# Patient Record
Sex: FEMALE | Race: WHITE | Employment: FULL TIME | URBAN - METROPOLITAN AREA
[De-identification: names, ages, dates, MRNs, and addresses within clinical notes are randomized per-mention and may not be internally consistent; named-entity substitution may affect disease eponyms.]

---

## 2017-02-07 ENCOUNTER — TELEPHONE (OUTPATIENT)
Dept: FAMILY MEDICINE CLINIC | Age: 42
End: 2017-02-07

## 2017-02-07 ENCOUNTER — OFFICE VISIT (OUTPATIENT)
Dept: FAMILY MEDICINE CLINIC | Age: 42
End: 2017-02-07

## 2017-02-07 ENCOUNTER — HOSPITAL ENCOUNTER (OUTPATIENT)
Dept: VASCULAR LAB | Age: 42
Discharge: HOME | End: 2017-02-07
Attending: FAMILY MEDICINE | Admitting: FAMILY MEDICINE

## 2017-02-07 VITALS
TEMPERATURE: 98.8 F | BODY MASS INDEX: 41.79 KG/M2 | SYSTOLIC BLOOD PRESSURE: 100 MMHG | DIASTOLIC BLOOD PRESSURE: 70 MMHG | WEIGHT: 266.8 LBS | HEART RATE: 68 BPM | RESPIRATION RATE: 12 BRPM

## 2017-02-07 DIAGNOSIS — M77.11 LATERAL EPICONDYLITIS OF RIGHT ELBOW: ICD-10-CM

## 2017-02-07 DIAGNOSIS — D69.6 THROMBOCYTOPENIA (HCC): ICD-10-CM

## 2017-02-07 DIAGNOSIS — M79.604 RIGHT LEG PAIN: ICD-10-CM

## 2017-02-07 DIAGNOSIS — M79.604 PAIN OF RIGHT LEG: ICD-10-CM

## 2017-02-07 DIAGNOSIS — M79.661 PAIN OF RIGHT LOWER LEG: Primary | ICD-10-CM

## 2017-02-07 DIAGNOSIS — F17.210 CIGARETTE NICOTINE DEPENDENCE WITHOUT COMPLICATION: ICD-10-CM

## 2017-02-07 DIAGNOSIS — K85.90 ACUTE PANCREATITIS, UNSPECIFIED COMPLICATION STATUS, UNSPECIFIED PANCREATITIS TYPE: Primary | ICD-10-CM

## 2017-02-07 LAB
D DIMER: 397 NG/ML DDU (ref 0–229)
HCT VFR BLD CALC: 43.6 % (ref 36–48)
HEMOGLOBIN: 14.3 G/DL (ref 12–16)
LIPASE: 33 U/L (ref 13–60)
MCH RBC QN AUTO: 30.2 PG (ref 26–34)
MCHC RBC AUTO-ENTMCNC: 32.8 G/DL (ref 31–36)
MCV RBC AUTO: 92 FL (ref 80–100)
PDW BLD-RTO: 13.2 % (ref 12.4–15.4)
PLATELET # BLD: 167 K/UL (ref 135–450)
PLATELET SLIDE REVIEW: ADEQUATE
PMV BLD AUTO: 9.3 FL (ref 5–10.5)
RBC # BLD: 4.74 M/UL (ref 4–5.2)
WBC # BLD: 7.4 K/UL (ref 4–11)

## 2017-02-07 PROCEDURE — 99214 OFFICE O/P EST MOD 30 MIN: CPT | Performed by: FAMILY MEDICINE

## 2017-02-07 RX ORDER — BUPROPION HYDROCHLORIDE 150 MG/1
150 TABLET ORAL EVERY MORNING
Qty: 30 TABLET | Refills: 3 | Status: CANCELLED | OUTPATIENT
Start: 2017-02-07

## 2019-02-13 ENCOUNTER — OFFICE VISIT (OUTPATIENT)
Dept: FAMILY MEDICINE CLINIC | Age: 44
End: 2019-02-13
Payer: COMMERCIAL

## 2019-02-13 VITALS
HEART RATE: 91 BPM | TEMPERATURE: 97.8 F | BODY MASS INDEX: 41.3 KG/M2 | SYSTOLIC BLOOD PRESSURE: 132 MMHG | HEIGHT: 66 IN | RESPIRATION RATE: 10 BRPM | DIASTOLIC BLOOD PRESSURE: 86 MMHG | WEIGHT: 257 LBS | OXYGEN SATURATION: 100 %

## 2019-02-13 DIAGNOSIS — Z00.00 WELL ADULT EXAM: ICD-10-CM

## 2019-02-13 DIAGNOSIS — Z23 NEED FOR TDAP VACCINATION: ICD-10-CM

## 2019-02-13 DIAGNOSIS — Z01.419 ENCOUNTER FOR ANNUAL ROUTINE GYNECOLOGICAL EXAMINATION: ICD-10-CM

## 2019-02-13 DIAGNOSIS — F17.210 CIGARETTE NICOTINE DEPENDENCE WITHOUT COMPLICATION: ICD-10-CM

## 2019-02-13 DIAGNOSIS — Z00.00 WELL ADULT EXAM: Primary | ICD-10-CM

## 2019-02-13 LAB
A/G RATIO: 1.5 (ref 1.1–2.2)
ALBUMIN SERPL-MCNC: 4.2 G/DL (ref 3.4–5)
ALP BLD-CCNC: 60 U/L (ref 40–129)
ALT SERPL-CCNC: 15 U/L (ref 10–40)
ANION GAP SERPL CALCULATED.3IONS-SCNC: 14 MMOL/L (ref 3–16)
AST SERPL-CCNC: 13 U/L (ref 15–37)
BILIRUB SERPL-MCNC: 0.3 MG/DL (ref 0–1)
BUN BLDV-MCNC: 13 MG/DL (ref 7–20)
CALCIUM SERPL-MCNC: 9.2 MG/DL (ref 8.3–10.6)
CHLORIDE BLD-SCNC: 101 MMOL/L (ref 99–110)
CHOLESTEROL, TOTAL: 210 MG/DL (ref 0–199)
CO2: 25 MMOL/L (ref 21–32)
CREAT SERPL-MCNC: 0.8 MG/DL (ref 0.6–1.1)
GFR AFRICAN AMERICAN: >60
GFR NON-AFRICAN AMERICAN: >60
GLOBULIN: 2.8 G/DL
GLUCOSE BLD-MCNC: 87 MG/DL (ref 70–99)
HCT VFR BLD CALC: 44.3 % (ref 36–48)
HDLC SERPL-MCNC: 46 MG/DL (ref 40–60)
HEMOGLOBIN: 14.8 G/DL (ref 12–16)
LDL CHOLESTEROL CALCULATED: 126 MG/DL
MCH RBC QN AUTO: 31.4 PG (ref 26–34)
MCHC RBC AUTO-ENTMCNC: 33.3 G/DL (ref 31–36)
MCV RBC AUTO: 94.2 FL (ref 80–100)
PDW BLD-RTO: 12.5 % (ref 12.4–15.4)
PLATELET # BLD: 201 K/UL (ref 135–450)
PMV BLD AUTO: 8.5 FL (ref 5–10.5)
POTASSIUM SERPL-SCNC: 4.4 MMOL/L (ref 3.5–5.1)
RBC # BLD: 4.7 M/UL (ref 4–5.2)
SODIUM BLD-SCNC: 140 MMOL/L (ref 136–145)
TOTAL PROTEIN: 7 G/DL (ref 6.4–8.2)
TRIGL SERPL-MCNC: 192 MG/DL (ref 0–150)
TSH REFLEX: 2.85 UIU/ML (ref 0.27–4.2)
VLDLC SERPL CALC-MCNC: 38 MG/DL
WBC # BLD: 7.7 K/UL (ref 4–11)

## 2019-02-13 PROCEDURE — 99396 PREV VISIT EST AGE 40-64: CPT | Performed by: FAMILY MEDICINE

## 2019-02-13 RX ORDER — MEDICAL SUPPLY, MISCELLANEOUS
2 EACH MISCELLANEOUS DAILY
Qty: 2 EACH | Refills: 2 | Status: SHIPPED | OUTPATIENT
Start: 2019-02-13 | End: 2021-03-19 | Stop reason: ALTCHOICE

## 2019-02-13 ASSESSMENT — ENCOUNTER SYMPTOMS
DIARRHEA: 0
TROUBLE SWALLOWING: 0
CHEST TIGHTNESS: 0
ABDOMINAL PAIN: 0
SORE THROAT: 0
SHORTNESS OF BREATH: 0
WHEEZING: 0
VOICE CHANGE: 0
ANAL BLEEDING: 0
CHOKING: 0
NAUSEA: 0
CONSTIPATION: 0
BLOOD IN STOOL: 0

## 2019-02-13 ASSESSMENT — PATIENT HEALTH QUESTIONNAIRE - PHQ9
2. FEELING DOWN, DEPRESSED OR HOPELESS: 0
1. LITTLE INTEREST OR PLEASURE IN DOING THINGS: 0
SUM OF ALL RESPONSES TO PHQ9 QUESTIONS 1 & 2: 0
SUM OF ALL RESPONSES TO PHQ QUESTIONS 1-9: 0
SUM OF ALL RESPONSES TO PHQ QUESTIONS 1-9: 0

## 2019-02-14 LAB
ESTIMATED AVERAGE GLUCOSE: 116.9 MG/DL
HBA1C MFR BLD: 5.7 %

## 2019-02-15 LAB
HPV COMMENT: NORMAL
HPV TYPE 16: NOT DETECTED
HPV TYPE 18: NOT DETECTED
HPVOH (OTHER TYPES): NOT DETECTED

## 2019-03-08 ENCOUNTER — TELEPHONE (OUTPATIENT)
Dept: FAMILY MEDICINE CLINIC | Age: 44
End: 2019-03-08

## 2019-03-17 ENCOUNTER — HOSPITAL ENCOUNTER (EMERGENCY)
Age: 44
Discharge: HOME | End: 2019-03-17
Attending: EMERGENCY MEDICINE
Payer: COMMERCIAL

## 2019-03-17 VITALS
OXYGEN SATURATION: 99 % | DIASTOLIC BLOOD PRESSURE: 86 MMHG | HEIGHT: 68 IN | TEMPERATURE: 98.1 F | SYSTOLIC BLOOD PRESSURE: 137 MMHG | WEIGHT: 252 LBS | HEART RATE: 103 BPM | RESPIRATION RATE: 17 BRPM | BODY MASS INDEX: 38.19 KG/M2

## 2019-03-17 DIAGNOSIS — B02.22 TRIGEMINAL HERPES ZOSTER: Primary | ICD-10-CM

## 2019-03-17 PROCEDURE — 6370000000 HC RX 637 (ALT 250 FOR IP): Performed by: EMERGENCY MEDICINE

## 2019-03-17 PROCEDURE — 99282 EMERGENCY DEPT VISIT SF MDM: CPT

## 2019-03-17 RX ORDER — ACYCLOVIR 800 MG/1
800 TABLET ORAL ONCE
Status: COMPLETED | OUTPATIENT
Start: 2019-03-17 | End: 2019-03-17

## 2019-03-17 RX ORDER — VALACYCLOVIR HYDROCHLORIDE 1 G/1
1000 TABLET, FILM COATED ORAL 3 TIMES DAILY
Qty: 21 TABLET | Refills: 0 | Status: SHIPPED | OUTPATIENT
Start: 2019-03-17 | End: 2019-03-24

## 2019-03-17 RX ADMIN — ACYCLOVIR 800 MG: 800 TABLET ORAL at 05:07

## 2019-03-17 RX ADMIN — FLUORESCEIN SODIUM 1 MG: 1 STRIP OPHTHALMIC at 04:13

## 2019-03-17 ASSESSMENT — PAIN DESCRIPTION - ORIENTATION: ORIENTATION: ANTERIOR

## 2019-03-17 ASSESSMENT — ENCOUNTER SYMPTOMS
RESPIRATORY NEGATIVE: 1
PHOTOPHOBIA: 0
EYE PAIN: 0
EYE DISCHARGE: 0
EYE ITCHING: 0
EYE REDNESS: 0
GASTROINTESTINAL NEGATIVE: 1
ALLERGIC/IMMUNOLOGIC NEGATIVE: 1

## 2019-03-17 ASSESSMENT — PAIN DESCRIPTION - LOCATION
LOCATION: NOSE
LOCATION: FACE;OTHER (COMMENT)

## 2019-03-17 ASSESSMENT — PAIN DESCRIPTION - PAIN TYPE
TYPE: ACUTE PAIN
TYPE: ACUTE PAIN

## 2019-03-17 ASSESSMENT — PAIN SCALES - GENERAL
PAINLEVEL_OUTOF10: 10
PAINLEVEL_OUTOF10: 6

## 2019-03-17 ASSESSMENT — PAIN DESCRIPTION - FREQUENCY: FREQUENCY: CONTINUOUS

## 2019-03-17 ASSESSMENT — PAIN DESCRIPTION - PROGRESSION: CLINICAL_PROGRESSION: GRADUALLY WORSENING

## 2019-03-17 ASSESSMENT — PAIN DESCRIPTION - DESCRIPTORS
DESCRIPTORS: DISCOMFORT;BURNING;OTHER (COMMENT)
DESCRIPTORS: THROBBING

## 2019-03-17 ASSESSMENT — PAIN DESCRIPTION - ONSET: ONSET: PROGRESSIVE

## 2019-03-18 ENCOUNTER — TELEPHONE (OUTPATIENT)
Dept: FAMILY MEDICINE CLINIC | Age: 44
End: 2019-03-18

## 2019-03-18 ENCOUNTER — HOSPITAL ENCOUNTER (EMERGENCY)
Age: 44
Discharge: HOME | End: 2019-03-18
Attending: EMERGENCY MEDICINE
Payer: COMMERCIAL

## 2019-03-18 ENCOUNTER — APPOINTMENT (OUTPATIENT)
Dept: CT IMAGING | Age: 44
End: 2019-03-18
Payer: COMMERCIAL

## 2019-03-18 ENCOUNTER — NURSE TRIAGE (OUTPATIENT)
Dept: OTHER | Facility: CLINIC | Age: 44
End: 2019-03-18

## 2019-03-18 VITALS
HEART RATE: 109 BPM | TEMPERATURE: 98.6 F | RESPIRATION RATE: 20 BRPM | DIASTOLIC BLOOD PRESSURE: 75 MMHG | SYSTOLIC BLOOD PRESSURE: 117 MMHG | OXYGEN SATURATION: 97 %

## 2019-03-18 VITALS
OXYGEN SATURATION: 96 % | TEMPERATURE: 97.8 F | HEIGHT: 67 IN | DIASTOLIC BLOOD PRESSURE: 83 MMHG | BODY MASS INDEX: 39.55 KG/M2 | SYSTOLIC BLOOD PRESSURE: 125 MMHG | HEART RATE: 92 BPM | RESPIRATION RATE: 16 BRPM | WEIGHT: 252 LBS

## 2019-03-18 DIAGNOSIS — L01.00 IMPETIGO: Primary | ICD-10-CM

## 2019-03-18 DIAGNOSIS — L03.211 CELLULITIS OF FACE: Primary | ICD-10-CM

## 2019-03-18 LAB
ANION GAP SERPL CALCULATED.3IONS-SCNC: 13 MMOL/L (ref 3–16)
ANION GAP SERPL CALCULATED.3IONS-SCNC: 15 MMOL/L (ref 3–16)
BASOPHILS ABSOLUTE: 0 K/UL (ref 0–0.2)
BASOPHILS RELATIVE PERCENT: 0.1 %
BILIRUBIN URINE: NEGATIVE
BLOOD, URINE: NEGATIVE
BUN BLDV-MCNC: 11 MG/DL (ref 7–20)
BUN BLDV-MCNC: 11 MG/DL (ref 7–20)
CALCIUM SERPL-MCNC: 8.5 MG/DL (ref 8.3–10.6)
CALCIUM SERPL-MCNC: 8.7 MG/DL (ref 8.3–10.6)
CHLORIDE BLD-SCNC: 100 MMOL/L (ref 99–110)
CHLORIDE BLD-SCNC: 104 MMOL/L (ref 99–110)
CLARITY: CLEAR
CO2: 18 MMOL/L (ref 21–32)
CO2: 24 MMOL/L (ref 21–32)
COLOR: YELLOW
CREAT SERPL-MCNC: 0.9 MG/DL (ref 0.6–1.1)
CREAT SERPL-MCNC: 0.9 MG/DL (ref 0.6–1.1)
EOSINOPHILS ABSOLUTE: 0.2 K/UL (ref 0–0.6)
EOSINOPHILS RELATIVE PERCENT: 2.8 %
GFR AFRICAN AMERICAN: >60
GFR AFRICAN AMERICAN: >60
GFR NON-AFRICAN AMERICAN: >60
GFR NON-AFRICAN AMERICAN: >60
GLUCOSE BLD-MCNC: 111 MG/DL (ref 70–99)
GLUCOSE BLD-MCNC: 150 MG/DL (ref 70–99)
GLUCOSE URINE: NEGATIVE MG/DL
HCT VFR BLD CALC: 44.4 % (ref 36–48)
HEMOGLOBIN: 14.9 G/DL (ref 12–16)
KETONES, URINE: NEGATIVE MG/DL
LACTATE: 3.28 MMOL/L (ref 0.4–2)
LACTIC ACID: 1.6 MMOL/L (ref 0.4–2)
LEUKOCYTE ESTERASE, URINE: NEGATIVE
LYMPHOCYTES ABSOLUTE: 0.6 K/UL (ref 1–5.1)
LYMPHOCYTES RELATIVE PERCENT: 8.3 %
MCH RBC QN AUTO: 31.3 PG (ref 26–34)
MCHC RBC AUTO-ENTMCNC: 33.5 G/DL (ref 31–36)
MCV RBC AUTO: 93.3 FL (ref 80–100)
MICROSCOPIC EXAMINATION: NORMAL
MONOCYTES ABSOLUTE: 0.3 K/UL (ref 0–1.3)
MONOCYTES RELATIVE PERCENT: 4.7 %
NEUTROPHILS ABSOLUTE: 5.7 K/UL (ref 1.7–7.7)
NEUTROPHILS RELATIVE PERCENT: 84.1 %
NITRITE, URINE: NEGATIVE
PDW BLD-RTO: 12.5 % (ref 12.4–15.4)
PERFORMED ON: ABNORMAL
PH UA: 6 (ref 5–8)
PLATELET # BLD: 139 K/UL (ref 135–450)
PMV BLD AUTO: 8 FL (ref 5–10.5)
POC SAMPLE TYPE: ABNORMAL
POTASSIUM REFLEX MAGNESIUM: 4 MMOL/L (ref 3.5–5.1)
POTASSIUM REFLEX MAGNESIUM: 6.6 MMOL/L (ref 3.5–5.1)
POTASSIUM SERPL-SCNC: 4 MMOL/L (ref 3.5–5.1)
PROTEIN UA: NEGATIVE MG/DL
RBC # BLD: 4.76 M/UL (ref 4–5.2)
SODIUM BLD-SCNC: 133 MMOL/L (ref 136–145)
SODIUM BLD-SCNC: 141 MMOL/L (ref 136–145)
SPECIFIC GRAVITY UA: 1.01 (ref 1–1.03)
URINE REFLEX TO CULTURE: NORMAL
URINE TYPE: NORMAL
UROBILINOGEN, URINE: 0.2 E.U./DL
WBC # BLD: 6.7 K/UL (ref 4–11)

## 2019-03-18 PROCEDURE — 99282 EMERGENCY DEPT VISIT SF MDM: CPT

## 2019-03-18 PROCEDURE — 87040 BLOOD CULTURE FOR BACTERIA: CPT

## 2019-03-18 PROCEDURE — 99283 EMERGENCY DEPT VISIT LOW MDM: CPT

## 2019-03-18 PROCEDURE — 85025 COMPLETE CBC W/AUTO DIFF WBC: CPT

## 2019-03-18 PROCEDURE — 84132 ASSAY OF SERUM POTASSIUM: CPT

## 2019-03-18 PROCEDURE — 6360000004 HC RX CONTRAST MEDICATION: Performed by: STUDENT IN AN ORGANIZED HEALTH CARE EDUCATION/TRAINING PROGRAM

## 2019-03-18 PROCEDURE — 80048 BASIC METABOLIC PNL TOTAL CA: CPT

## 2019-03-18 PROCEDURE — 96360 HYDRATION IV INFUSION INIT: CPT

## 2019-03-18 PROCEDURE — 83605 ASSAY OF LACTIC ACID: CPT

## 2019-03-18 PROCEDURE — 2580000003 HC RX 258: Performed by: STUDENT IN AN ORGANIZED HEALTH CARE EDUCATION/TRAINING PROGRAM

## 2019-03-18 PROCEDURE — 70487 CT MAXILLOFACIAL W/DYE: CPT

## 2019-03-18 PROCEDURE — 36415 COLL VENOUS BLD VENIPUNCTURE: CPT

## 2019-03-18 PROCEDURE — 81003 URINALYSIS AUTO W/O SCOPE: CPT

## 2019-03-18 RX ORDER — 0.9 % SODIUM CHLORIDE 0.9 %
1000 INTRAVENOUS SOLUTION INTRAVENOUS ONCE
Status: COMPLETED | OUTPATIENT
Start: 2019-03-18 | End: 2019-03-18

## 2019-03-18 RX ORDER — SULFAMETHOXAZOLE AND TRIMETHOPRIM 800; 160 MG/1; MG/1
1 TABLET ORAL 2 TIMES DAILY
Qty: 14 TABLET | Refills: 0 | Status: SHIPPED | OUTPATIENT
Start: 2019-03-18 | End: 2019-03-25

## 2019-03-18 RX ORDER — CEPHALEXIN 500 MG/1
500 CAPSULE ORAL 3 TIMES DAILY
Qty: 21 CAPSULE | Refills: 0 | Status: SHIPPED | OUTPATIENT
Start: 2019-03-18 | End: 2019-03-25

## 2019-03-18 RX ADMIN — IOPAMIDOL 80 ML: 755 INJECTION, SOLUTION INTRAVENOUS at 22:14

## 2019-03-18 RX ADMIN — SODIUM CHLORIDE 1000 ML: 0.9 INJECTION, SOLUTION INTRAVENOUS at 20:50

## 2019-03-18 ASSESSMENT — ENCOUNTER SYMPTOMS
NAUSEA: 0
VOMITING: 0
SHORTNESS OF BREATH: 0
COUGH: 0
DIARRHEA: 0
ABDOMINAL PAIN: 0

## 2019-03-18 ASSESSMENT — PAIN DESCRIPTION - LOCATION: LOCATION: GENERALIZED

## 2019-03-18 ASSESSMENT — PAIN DESCRIPTION - DESCRIPTORS: DESCRIPTORS: ACHING

## 2019-03-18 ASSESSMENT — PAIN SCALES - GENERAL: PAINLEVEL_OUTOF10: 8

## 2019-03-18 ASSESSMENT — PAIN DESCRIPTION - FREQUENCY: FREQUENCY: CONTINUOUS

## 2019-03-21 ENCOUNTER — OFFICE VISIT (OUTPATIENT)
Dept: FAMILY MEDICINE CLINIC | Age: 44
End: 2019-03-21
Payer: COMMERCIAL

## 2019-03-21 VITALS
BODY MASS INDEX: 40.63 KG/M2 | OXYGEN SATURATION: 95 % | SYSTOLIC BLOOD PRESSURE: 137 MMHG | WEIGHT: 259.4 LBS | DIASTOLIC BLOOD PRESSURE: 87 MMHG | TEMPERATURE: 98.7 F | HEART RATE: 101 BPM

## 2019-03-21 DIAGNOSIS — R68.89 FLU-LIKE SYMPTOMS: ICD-10-CM

## 2019-03-21 DIAGNOSIS — L03.211 CELLULITIS OF FACE: Primary | ICD-10-CM

## 2019-03-21 PROCEDURE — 1111F DSCHRG MED/CURRENT MED MERGE: CPT | Performed by: FAMILY MEDICINE

## 2019-03-21 PROCEDURE — 99213 OFFICE O/P EST LOW 20 MIN: CPT | Performed by: FAMILY MEDICINE

## 2019-03-21 RX ORDER — DOXYCYCLINE HYCLATE 100 MG
100 TABLET ORAL 2 TIMES DAILY
Qty: 20 TABLET | Refills: 0 | Status: SHIPPED | OUTPATIENT
Start: 2019-03-21 | End: 2019-03-31

## 2019-03-21 RX ORDER — SULFAMETHOXAZOLE AND TRIMETHOPRIM 800; 160 MG/1; MG/1
1 TABLET ORAL 2 TIMES DAILY
Qty: 14 TABLET | Refills: 0 | Status: SHIPPED | OUTPATIENT
Start: 2019-03-21 | End: 2019-03-28

## 2019-03-23 LAB
BLOOD CULTURE, ROUTINE: NORMAL
CULTURE, BLOOD 2: NORMAL

## 2019-03-29 ENCOUNTER — OFFICE VISIT (OUTPATIENT)
Dept: FAMILY MEDICINE CLINIC | Age: 44
End: 2019-03-29
Payer: COMMERCIAL

## 2019-03-29 VITALS
TEMPERATURE: 98.1 F | HEART RATE: 80 BPM | DIASTOLIC BLOOD PRESSURE: 70 MMHG | OXYGEN SATURATION: 97 % | BODY MASS INDEX: 40.63 KG/M2 | WEIGHT: 259.4 LBS | SYSTOLIC BLOOD PRESSURE: 110 MMHG

## 2019-03-29 DIAGNOSIS — L03.211 FACIAL CELLULITIS: Primary | ICD-10-CM

## 2019-03-29 DIAGNOSIS — T50.905A ADVERSE EFFECT OF DRUG, INITIAL ENCOUNTER: ICD-10-CM

## 2019-03-29 PROCEDURE — 99213 OFFICE O/P EST LOW 20 MIN: CPT | Performed by: FAMILY MEDICINE

## 2019-05-13 ENCOUNTER — TELEPHONE (OUTPATIENT)
Dept: FAMILY MEDICINE CLINIC | Age: 44
End: 2019-05-13

## 2019-05-13 RX ORDER — VALACYCLOVIR HYDROCHLORIDE 1 G/1
1000 TABLET, FILM COATED ORAL 3 TIMES DAILY
Qty: 21 TABLET | Refills: 0 | Status: SHIPPED | OUTPATIENT
Start: 2019-05-13 | End: 2019-05-20

## 2019-05-14 ENCOUNTER — OFFICE VISIT (OUTPATIENT)
Dept: FAMILY MEDICINE CLINIC | Age: 44
End: 2019-05-14
Payer: COMMERCIAL

## 2019-05-14 VITALS
HEIGHT: 68 IN | RESPIRATION RATE: 8 BRPM | BODY MASS INDEX: 39.56 KG/M2 | DIASTOLIC BLOOD PRESSURE: 73 MMHG | OXYGEN SATURATION: 98 % | HEART RATE: 91 BPM | TEMPERATURE: 96.2 F | WEIGHT: 261 LBS | SYSTOLIC BLOOD PRESSURE: 115 MMHG

## 2019-05-14 DIAGNOSIS — J34.0 NASAL ABSCESS: Primary | ICD-10-CM

## 2019-05-14 PROCEDURE — 99213 OFFICE O/P EST LOW 20 MIN: CPT | Performed by: FAMILY MEDICINE

## 2019-05-14 RX ORDER — DOXYCYCLINE HYCLATE 100 MG/1
100 CAPSULE ORAL 2 TIMES DAILY
Qty: 20 CAPSULE | Refills: 0 | Status: SHIPPED | OUTPATIENT
Start: 2019-05-14 | End: 2019-05-24

## 2019-05-14 NOTE — LETTER
[address] [place] [address]  Phone: [phone]  Fax: [phone]    Nick Stokes MD                                                                                   May 14, 2019                       [address] [place] [address]      Dear Jose Jha: Thank you for enrolling in [address] Please follow the instructions below to securely access your online medical record. [employer] allows you to send messages to your doctor, view your test results, renew your prescriptions, schedule appointments, and more. How Do I Sign Up? 1. In your Internet browser, go to [email or web address]  2. Click on the Sign Up Now link in the Sign In box. You will see the New Member Sign Up page. 3. Enter your [employer] Access Code exactly as it appears below. You will not need to use this code after youve completed the sign-up process. If you do not sign up before the expiration date, you must request a new code. [employer] Access Code: [email or web address]  Expires: 6/28/2019  9:11 AM    4. Enter your Social Security Number ([id number]) and Date of Birth (mm/dd/yyyy) as indicated and click Submit. You will be taken to the next sign-up page. 5. Create a [employer] ID. This will be your [employer] login ID and cannot be changed, so think of one that is secure and easy to remember. 6. Create a [employer] password. You can change your password at any time. 7. Enter your Password Reset Question and Answer. This can be used at a later time if you forget your password. 8. Enter your e-mail address. You will receive e-mail notification when new information is available in [facility] [address] [facility] [address]. 9. Click Sign Up. You can now view your medical record. Additional Information  If you have questions, please contact the physician practice where you receive care. Remember, [employer] is NOT to be used for urgent needs. For medical emergencies, dial 911.

## 2019-05-14 NOTE — PROGRESS NOTES
Subjective:      Patient ID: Kathy Aparicio 38 y.o. female. The encounter diagnosis was Herpes zoster without complication. HPI    Teagan called yesterday with rash on her nose concerning for shingles. Started on Valtrex empirically. She has recurrent redness nose x 2. First episode 3/18. Was treated in the ER with Bactrim and Keflex for Impetigo. Valtrex was added for possible shingles. Had myalgias with Bactrim. Was switched to Doxycycline and sxs resolved. Occurred again 3 days ago. Gradually getting worse - increase in redness of the now and now has a pustule on the nose. No fever, chills or malaise. Rx: Valtrex. Outpatient Medications Marked as Taking for the 5/14/19 encounter (Office Visit) with Vicente Ashley MD   Medication Sig Dispense Refill    valACYclovir (VALTREX) 1 g tablet Take 1 tablet by mouth 3 times daily for 7 days 21 tablet 0    Elastic Bandages & Supports (FUTURO SHEER SUPPORT HOSE) MISC 2 Units by Does not apply route daily 2 each 2        Allergies   Allergen Reactions    Bactrim [Sulfamethoxazole-Trimethoprim] Other (See Comments)     Flu like symptoms       Patient Active Problem List   Diagnosis    Hirsutism    Edema    Cigarette nicotine dependence without complication       Past Medical History:   Diagnosis Date    Acute bronchitis     BV (bacterial vaginosis) 10/20/2016    Edema     Hirsutism     Shingles 7/29/2011       No past surgical history on file.      Family History   Problem Relation Age of Onset    Osteoarthritis Mother     Hypertension Mother     Diabetes Mother     Ulcerative Colitis Mother     Colon Polyps Mother     Elevated Lipids Mother     Hypertension Father    [facility] Elevated Lipids Father     Heart Attack Father 61    Deep Vein Thrombosis Brother         wtih PE; no trigger       Social History     Tobacco Use    Smoking status: Current Every Day Smoker     Packs/day: 0.50     Years: 22.00     Pack years: 11.00    Smokeless tobacco: Never Used   Substance Use Topics    Alcohol use: Yes    Drug use: No            Review of Systems  Review of Systems    Objective:   Physical Exam  There were no vitals filed for this visit. Physical Exam   Constitutional: She appears well-developed and well-nourished. No distress. Cardiovascular: Normal rate, regular rhythm and normal heart sounds. Pulmonary/Chest: Effort normal and breath sounds normal.   Lymphadenopathy:        Head (right side): No submental and no submandibular adenopathy present. Head (left side): No submental and no submandibular adenopathy present. She has no cervical adenopathy. Skin: Skin is warm and dry. Rash noted. Rash is pustular. There is erythema. 3 mm pustule with 1.5 cm induration and erythema left side nose. Assessment:      .   Diagnosis Orders   1. Nasal abscess  mupirocin (BACTROBAN NASAL) 2 % nasal ointment    doxycycline hyclate (VIBRAMYCIN) 100 MG capsule    ANAEROBIC AND AEROBIC CULTURE          Plan:      I do not think this is shingles; can stop Valtrex. Good hand washing advised. Side effects of current medications reviewed and questions answered. Call or return to clinic prn if these symptoms worsen or fail to improve as anticipated.

## 2019-05-16 LAB
ANAEROBIC CULTURE: ABNORMAL
GRAM STAIN RESULT: ABNORMAL
WOUND/ABSCESS: ABNORMAL

## 2020-03-13 ENCOUNTER — NURSE TRIAGE (OUTPATIENT)
Dept: OTHER | Facility: CLINIC | Age: 45
End: 2020-03-13

## 2020-03-13 NOTE — TELEPHONE ENCOUNTER
Reason for Disposition   Nasal discharge present > 10 days    Protocols used: COUGH-ADULT-OH    Patient called pre-service center [facility] to schedule appointment, with red flag complaint, transferred to RN access for triage. Pt c/o feeling ill over the last 1-2 weeks. Pt states symptoms started with sinus and sneezing. Now it's more in her chest and she has productive cough. No fever. Pt is smoker. No ear pain. No sore throat. No sob. No cp. Pt has been using mucinex without relief. Fluid intake is good. Triage indicates for pt to be seen in the next 3 days. Pt instructed to call back for any new or worsening symptoms. Patient verbalized understanding. Patient denies any other questions or concerns. Writer provided warm transfer to [facility] Bety Herrera for appointment scheduling. Please do not respond to the triage nurse through this encounter. Any subsequent communication should be directly with the patient.

## 2020-03-16 ENCOUNTER — OFFICE VISIT (OUTPATIENT)
Dept: FAMILY MEDICINE CLINIC | Age: 45
End: 2020-03-16
Payer: COMMERCIAL

## 2020-03-16 VITALS
WEIGHT: 274.5 LBS | OXYGEN SATURATION: 98 % | SYSTOLIC BLOOD PRESSURE: 120 MMHG | BODY MASS INDEX: 42.36 KG/M2 | DIASTOLIC BLOOD PRESSURE: 82 MMHG | TEMPERATURE: 97.2 F | RESPIRATION RATE: 12 BRPM | HEART RATE: 89 BPM

## 2020-03-16 PROCEDURE — 99214 OFFICE O/P EST MOD 30 MIN: CPT | Performed by: FAMILY MEDICINE

## 2020-03-16 RX ORDER — VARENICLINE TARTRATE
KIT
Qty: 1 BOX | Refills: 0 | Status: SHIPPED | OUTPATIENT
Start: 2020-03-16 | End: 2021-03-19 | Stop reason: ALTCHOICE

## 2020-03-16 RX ORDER — AMOXICILLIN AND CLAVULANATE POTASSIUM 875; 125 MG/1; MG/1
1 TABLET, FILM COATED ORAL 2 TIMES DAILY
Qty: 20 TABLET | Refills: 0 | Status: SHIPPED | OUTPATIENT
Start: 2020-03-16 | End: 2020-03-26

## 2020-03-16 RX ORDER — GUAIFENESIN 600 MG/1
1200 TABLET, EXTENDED RELEASE ORAL 2 TIMES DAILY
COMMUNITY
End: 2021-03-19 | Stop reason: ALTCHOICE

## 2020-03-16 RX ORDER — VARENICLINE TARTRATE 1 MG/1
1 TABLET, FILM COATED ORAL 2 TIMES DAILY
Qty: 60 TABLET | Refills: 5 | Status: SHIPPED | OUTPATIENT
Start: 2020-03-16 | End: 2021-03-19 | Stop reason: ALTCHOICE

## 2020-03-16 RX ORDER — IBUPROFEN 200 MG
400 TABLET ORAL EVERY 6 HOURS PRN
COMMUNITY

## 2020-03-16 ASSESSMENT — PATIENT HEALTH QUESTIONNAIRE - PHQ9
SUM OF ALL RESPONSES TO PHQ QUESTIONS 1-9: 0
2. FEELING DOWN, DEPRESSED OR HOPELESS: 0
SUM OF ALL RESPONSES TO PHQ9 QUESTIONS 1 & 2: 0
1. LITTLE INTEREST OR PLEASURE IN DOING THINGS: 0
SUM OF ALL RESPONSES TO PHQ QUESTIONS 1-9: 0

## 2020-03-16 NOTE — LETTER
For questions regarding your [employer] account call [phone]. If you have a clinical question, please call your doctor's office.     Sincerely,  Agnieszka Tuttle MD

## 2020-03-16 NOTE — PATIENT INSTRUCTIONS
with others, smoke alone. Turn your chair to an empty corner and focus only on the cigarette you are smoking and all its many negative effects. Collect all your cigarette butts in one large glass container as a visual reminder of the filth made by smoking. Just Before Quitting   Practice going without cigarettes. Don't think of never smoking again. Think of quitting in terms of one day at a time . Tell yourself you won't smoke today, and then don't. Clean your clothes to rid them of the cigarette smell, which can linger a long time. On the Day You Quit   Throw away all your cigarettes and matches. Hide your lighters and ashtrays. Visit the dentist and have your teeth cleaned to get rid of tobacco stains. Notice how nice they look and resolve to keep them that way. Make a list of things you'd like to buy for yourself or someone else. Estimate the cost in terms of packs of cigarettes, and put the money aside to buy these presents. Keep very busy on the big day. Go to the movies, exercise, take long walks, or go bike riding. Remind your family and friends that this is your quit date, and ask them to help you over the rough spots of the first couple of days and weeks. Buy yourself a treat or do something special to celebrate. Telephone and Internet Support   Telephone, web-, and computer-based programs can offer you the support that you need to quit and to stay smoke-free. You can find many programs online, like the American Lung Association's [place] from Smoking . Immediately After Quitting   Develop a clean, fresh, nonsmoking environment around yourselfat work and at home. Buy yourself flowersyou may be surprised how much you can enjoy their scent now. The first few days after you quit, spend as much free time as possible in places where smoking isn't allowed, such as [address] [place] [address] Street, museums, theaters, department stores, and churches.    Drink large quantities of water and fruit juice (but avoid sodas that contain caffeine). Try to avoid alcohol, coffee, and other beverages that you associate with cigarette smoking. Strike up conversation instead of a match for a cigarette. If you miss the sensation of having a cigarette in your hand, play with something elsea pencil, a paper clip, a marble. If you miss having something in your mouth, try toothpicks or a fake cigarette. Here are some useful phone numbers and resources for you to help your smoking cessation. [address] [place] [address] Street: [phone]  Smoking cessation programs in [facility]: [phone]  \"Freshstart\" a 4-session program for smoking cessation - group sessions    [facility] Use Prevention and Control [facility]: 1800-QUIT-NOW     [facility]: 338-991-NQFH  \"Freshstart' - 4-session program for smoking cessation - group sessions    [facility]: [phone]  Personal Smoking cessation consultations - teens and adults  [place] By appointment $360    [facility]: [phone]  Offers individual hypnosis, behavior modifications, and counseling in this program. Three sessions over 2-week period  $155 (total cost)    Nicotine Anonymous: [phone]  Open group cessation - everyone welcome     American Cancer Society: 575.454.2400    American Lung Association: 1-800-LUNG-USA    On-line help:  www.cancer. org  www.quitnet. org  www. whyquit. com  www.stopsmokingsuppport. com  www. ffsonline. org

## 2020-03-16 NOTE — PROGRESS NOTES
Subjective:      Patient ID: Tosin Farmer 41 y.o. female. is here for evaluation for URI. HPI    Iker Lloyd complains of cough and congestion x 3 weeks. No fever and no travel. The cough is productive green to yellow mucous and nasal discharge is the same. No ST.  Ears are a bit itchy. Denies, cheat pain, dyspnea, facial pain, fatigue or malaise. occ loose stool. No nausea or vomiting. Getting worse the past week  She also has pain in a right lower tooth x 7 days. Sensitive to heat and cold. She has not called her dentist yet. Rx: Ibuprofen helps a bit. Is worried about weight. Outpatient Medications Marked as Taking for the 3/16/20 encounter (Office Visit) with Shayla Workman MD   Medication Sig Dispense Refill    ibuprofen (ADVIL;MOTRIN) 200 MG tablet Take 200 mg by mouth every 6 hours as needed for Pain      guaiFENesin (MUCINEX) 600 MG extended release tablet Take 1,200 mg by mouth 2 times daily          Allergies   Allergen Reactions    Bactrim [Sulfamethoxazole-Trimethoprim] Other (See Comments)     Flu like symptoms       Patient Active Problem List   Diagnosis    Hirsutism    Edema    Cigarette nicotine dependence without complication       Past Medical History:   Diagnosis Date    Acute bronchitis     BV (bacterial vaginosis) 10/20/2016    Edema     Hirsutism     Shingles 7/29/2011       History reviewed. No pertinent surgical history.      Family History   Problem Relation Age of Onset    Osteoarthritis Mother     Hypertension Mother     Diabetes Mother     Ulcerative Colitis Mother     Colon Polyps Mother     Elevated Lipids Mother     Hypertension Father    [facility] Elevated Lipids Father     Heart Attack Father 61    Deep Vein Thrombosis Brother         wtih PE; no trigger       Social History     Tobacco Use    Smoking status: Current Every Day Smoker     Packs/day: 0.50     Years: 22.00     Pack years: 11.00    Smokeless tobacco: Never Used   Substance Use Topics    Alcohol use: Yes    Drug use: No            Review of Systems  Review of Systems    Objective:   Physical Exam  Vitals:    03/16/20 1612 03/16/20 1620   BP: (!) 141/89 120/82   Pulse: 89    Resp: 12    Temp: 97.2 °F (36.2 °C)    TempSrc: Oral    SpO2: 98%    Weight: 274 lb 8 oz (124.5 kg)         Physical Exam  NAD    Skin is warm and dry. Well hydrated, no rash. No respiratory distress. Ear exam - bilateral normal, TM intact without perforation or effusion, external canal normal. No significant ceruminosis noted. . Nares boggy with mucoid discharge. Moderate maxillary sinus tenderness. Pharynx normal, no oral lesions. Shotty cervical LNS, neg submandibular and supraclavicular LNS. Chest is clear, no wheezing or rales. Normal symmetric air entry throughout both lung fields. Cardiac regular w/o murmer, gallop. Assessment:       Diagnosis Orders   1. Acute non-recurrent maxillary sinusitis  amoxicillin-clavulanate (AUGMENTIN) 875-125 MG per tablet   2. Cigarette nicotine dependence without complication  varenicline (CHANTIX) 1 MG tablet    varenicline (CHANTIX STARTING MONTH PRACHI) 0.5 MG X 11 & 1 MG X 42 tablet  Smoking cessation addressed   3. Class 3 severe obesity due to excess calories without serious comorbidity with body mass index (BMI) of 40.0 to 44.9 in adult [facility])  Discussed diet, exercise and weight loss strategies           Plan:      Side effects of current medications reviewed and questions answered. Call or return to clinic prn if these symptoms worsen or fail to improve as anticipated.

## 2020-11-25 ENCOUNTER — TELEMEDICINE (OUTPATIENT)
Dept: FAMILY MEDICINE CLINIC | Age: 45
End: 2020-11-25

## 2020-11-25 ENCOUNTER — TELEPHONE (OUTPATIENT)
Dept: FAMILY MEDICINE CLINIC | Age: 45
End: 2020-11-25

## 2020-11-25 NOTE — TELEPHONE ENCOUNTER
Patient calling stating she tested postive for covid she took a rapid test at her place of work, she is requesting to make an appointment with the provider.      Virtual Visit appointment made  9:30am  12/3/20

## 2020-11-27 ENCOUNTER — TELEPHONE (OUTPATIENT)
Dept: FAMILY MEDICINE CLINIC | Age: 45
End: 2020-11-27

## 2021-03-18 ENCOUNTER — TELEPHONE (OUTPATIENT)
Dept: FAMILY MEDICINE CLINIC | Age: 46
End: 2021-03-18

## 2021-03-18 NOTE — TELEPHONE ENCOUNTER
See telephone note  ----- Message from Shabbir Nam sent at 3/18/2021 11:32 AM EDT -----  Subject: Message to Provider    QUESTIONS  Information for Provider? per patient stated dr Stefania Orozco agreed to see her   2 sons both need an appt.   ---------------------------------------------------------------------------  --------------  CALL BACK INFO  What is the best way for the office to contact you? OK to leave message on   voicemail  Preferred Call Back Phone Number? [phone]  ---------------------------------------------------------------------------  --------------  SCRIPT ANSWERS  Relationship to Patient?  Self

## 2021-03-18 NOTE — TELEPHONE ENCOUNTER
PT called in to see if Dr. Van Jernigan be willing to see her sons as new patients. If son can be np will change her appt around to get him in at the same time.      Please advise, thanks

## 2021-03-19 ENCOUNTER — HOSPITAL ENCOUNTER (EMERGENCY)
Age: 46
Discharge: HOME | End: 2021-03-19
Payer: COMMERCIAL

## 2021-03-19 ENCOUNTER — APPOINTMENT (OUTPATIENT)
Dept: GENERAL RADIOLOGY | Age: 46
End: 2021-03-19
Payer: COMMERCIAL

## 2021-03-19 VITALS
HEIGHT: 68 IN | HEART RATE: 88 BPM | RESPIRATION RATE: 20 BRPM | BODY MASS INDEX: 40.92 KG/M2 | SYSTOLIC BLOOD PRESSURE: 144 MMHG | DIASTOLIC BLOOD PRESSURE: 77 MMHG | OXYGEN SATURATION: 96 % | WEIGHT: 270 LBS | TEMPERATURE: 97.9 F

## 2021-03-19 DIAGNOSIS — M25.511 ACUTE PAIN OF RIGHT SHOULDER: Primary | ICD-10-CM

## 2021-03-19 PROCEDURE — 6370000000 HC RX 637 (ALT 250 FOR IP): Performed by: NURSE PRACTITIONER

## 2021-03-19 PROCEDURE — 73030 X-RAY EXAM OF SHOULDER: CPT

## 2021-03-19 PROCEDURE — 99282 EMERGENCY DEPT VISIT SF MDM: CPT

## 2021-03-19 RX ORDER — ACETAMINOPHEN 325 MG/1
650 TABLET ORAL ONCE
Status: COMPLETED | OUTPATIENT
Start: 2021-03-19 | End: 2021-03-19

## 2021-03-19 RX ORDER — LIDOCAINE 4 G/G
1 PATCH TOPICAL ONCE
Status: DISCONTINUED | OUTPATIENT
Start: 2021-03-19 | End: 2021-03-20 | Stop reason: HOSPADM

## 2021-03-19 RX ORDER — LIDOCAINE 50 MG/G
1 PATCH TOPICAL DAILY
Qty: 30 PATCH | Refills: 0 | Status: SHIPPED | OUTPATIENT
Start: 2021-03-19 | End: 2021-03-19 | Stop reason: SDUPTHER

## 2021-03-19 RX ORDER — LIDOCAINE 4 G/G
1 PATCH TOPICAL DAILY
Status: DISCONTINUED | OUTPATIENT
Start: 2021-03-20 | End: 2021-03-19

## 2021-03-19 RX ORDER — LIDOCAINE 50 MG/G
1 PATCH TOPICAL DAILY
Qty: 30 PATCH | Refills: 0 | Status: SHIPPED | OUTPATIENT
Start: 2021-03-19 | End: 2021-05-06 | Stop reason: ALTCHOICE

## 2021-03-19 RX ADMIN — ACETAMINOPHEN 650 MG: 325 TABLET ORAL at 22:27

## 2021-03-19 ASSESSMENT — PAIN SCALES - GENERAL: PAINLEVEL_OUTOF10: 6

## 2021-03-19 ASSESSMENT — PAIN DESCRIPTION - FREQUENCY: FREQUENCY: INTERMITTENT

## 2021-03-19 ASSESSMENT — PAIN DESCRIPTION - PAIN TYPE: TYPE: ACUTE PAIN

## 2021-03-19 ASSESSMENT — PAIN DESCRIPTION - LOCATION: LOCATION: ARM

## 2021-03-19 ASSESSMENT — PAIN DESCRIPTION - ORIENTATION: ORIENTATION: LEFT

## 2021-03-20 NOTE — ED PROVIDER NOTES
[facility]  EMERGENCY DEPARTMENT ENCOUNTER          NURSE PRACTITIONER NOTE       Date of evaluation: 3/19/2021    Chief Complaint     Arm Pain (right arm pain x one month, has been working out ? injured, pain comes and goes, pain increased today with certain movements)    History of Present Illness     Dulce Elizabeth is a 40 y.o. female who presents to the emergency department with a complaint of ongoing right arm pain. Patient states he has had pain for approximately the last month, seems to be progressively worsening. 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 She is right-hand dominant. States that today the pain seemed to get so bad that she felt that she needed evaluation. She did call her primary care provider and has an appointment for next week, however did not feel she could wait that long. Currently rates her pain at a 6 out of 10, aching and sharp in nature. Patient states that the pain is in her shoulder, however seems to radiate throughout her entire arm. Denies specific injury or trauma. Review of Systems     Review of Systems   Constitutional: Negative. Musculoskeletal: Positive for arthralgias. Skin: Negative. Allergic/Immunologic: Negative for immunocompromised state. Neurological: Negative for numbness. Hematological: Does not bruise/bleed easily. Psychiatric/Behavioral: Negative. Past Medical, Surgical, Family, and Social History     She has a past medical history of Acute bronchitis, BV (bacterial vaginosis), COVID-19, Edema, Hirsutism, Obesity, and Shingles. She has no past surgical history on file.   Her family history includes Colon Polyps in her mother; Deep Vein Thrombosis in her brother; Diabetes in her mother; Elevated Lipids in her father and mother; Heart Attack (age of onset: 61) in her father; Hypertension in her father and mother; Osteoarthritis in her mother; Ulcerative Colitis in her mother. She reports that she has been smoking cigarettes. She has a 16.50 pack-year smoking history. She has never used smokeless tobacco. She reports current alcohol use. She reports that she does not use drugs. Medications     Current Discharge Medication List      CONTINUE these medications which have NOT CHANGED    Details   ibuprofen (ADVIL;MOTRIN) 200 MG tablet Take 400 mg by mouth every 6 hours as needed for Pain Took 800 mg at 1730 today 03/19/2021             Allergies     She is allergic to bactrim [sulfamethoxazole-trimethoprim]. Physical Exam     INITIAL VITALS: BP: (!) 144/77, Temp: 97.9 °F (36.6 °C), Pulse: 88, Resp: 20, SpO2: 96 %   Physical Exam  Vitals signs and nursing note reviewed. Constitutional:       Appearance: Normal appearance. She is obese. Cardiovascular:      Rate and Rhythm: Normal rate and regular rhythm. Pulses: Normal pulses. Heart sounds: Normal heart sounds. No murmur. No gallop. Pulmonary:      Effort: Pulmonary effort is normal. No respiratory distress. Breath sounds: Normal breath sounds. Musculoskeletal:      Comments: TTP of the right shoulder joint without edema or crepitus, no rashes. Able to rotate with minimal pain, able to abduct and adduct with minimal difficulty. Intact distal pulses and intact sensation. Strength 5/5. Skin:     General: Skin is warm and dry. Capillary Refill: Capillary refill takes less than 2 seconds. Findings: No rash. Neurological:      Mental Status: She is alert and oriented to person, place, and time. Psychiatric:         Mood and Affect: Mood normal.         Behavior: Behavior normal.         Diagnostic Results       RADIOLOGY:  XR SHOULDER RIGHT (MIN 2 VIEWS)   Final Result   Impression:    No acute osseous injury or dislocation. LABS:   No results found for this visit on 03/19/21.       RECENT VITALS: BP: (!) 144/77, Temp: 97.9 °F (36.6 °C), Pulse: 88, Resp: 20, SpO2: 96 %       ED Course     Nursing Notes, Past Medical Hx, Past Surgical Hx, Social Hx, Allergies, and Family Hx were reviewed. The patient was given the following medications:  Orders Placed This Encounter   Medications    DISCONTD: lidocaine 4 % external patch 1 patch    acetaminophen (TYLENOL) tablet 650 mg    lidocaine 4 % external patch 1 patch    DISCONTD: lidocaine (LIDODERM) 5 %     Sig: Place 1 patch onto the skin daily 12 hours on, 12 hours off. Dispense:  30 patch     Refill:  0    lidocaine (LIDODERM) 5 %     Sig: Place 1 patch onto the skin daily 12 hours on, 12 hours off. Dispense:  30 patch     Refill:  0            CONSULTS:  None    MEDICAL DECISION MAKING / ASSESSMENT / Nayeli Lauren is a 40 y.o. female who presents with complaints as noted in HPI. Patient presents to the emergency department with a complaint of ongoing and worsening right shoulder pain. Has been ongoing for over a month, patient is right-hand dominant, and does have a physical job at a nursing home where she is having transfer patients and lift patients frequently, and does state that she has been working out more than normal.  Symptoms concerning for an overuse injury, however given patient's tenderness palpation of the right shoulder joint, x-rays were obtained which showed no evidence of acute osseous abnormality. Patient had previously taken ibuprofen before arrival, was given a dose of Tylenol here, and had a Lidoderm patch applied to her posterior shoulder with relief of symptoms. Patient was discharged home with prescription for Lidoderm patches, as well as instructions on Tylenol and ibuprofen use. She is provided with a work note for limited lifting/transfer for the next week to help facilitate rest of this area. Also given instructions on intermittent ice.   Patient does have a follow-up with her primary care provider next week, encouraged to keep this appointment for repeat evaluation. Clinical Impression     1. Acute pain of right shoulder        Disposition     PATIENT REFERRED TO:  MD Emily Douglas 150  [address]  [phone]      at scheduled appointment tomorrow      DISCHARGE MEDICATIONS:  Current Discharge Medication List      START taking these medications    Details   lidocaine (LIDODERM) 5 % Place 1 patch onto the skin daily 12 hours on, 12 hours off.   Qty: 30 patch, Refills: 0             DISPOSITION  Home in stable condition         MAXIMO Flores - CNP  03/19/21 1288

## 2021-03-24 ENCOUNTER — OFFICE VISIT (OUTPATIENT)
Dept: FAMILY MEDICINE CLINIC | Age: 46
End: 2021-03-24
Payer: COMMERCIAL

## 2021-03-24 VITALS
SYSTOLIC BLOOD PRESSURE: 133 MMHG | OXYGEN SATURATION: 96 % | DIASTOLIC BLOOD PRESSURE: 80 MMHG | TEMPERATURE: 98.3 F | WEIGHT: 273.6 LBS | RESPIRATION RATE: 12 BRPM | BODY MASS INDEX: 42.22 KG/M2 | HEART RATE: 95 BPM

## 2021-03-24 DIAGNOSIS — M75.81 ROTATOR CUFF TENDONITIS, RIGHT: Primary | ICD-10-CM

## 2021-03-24 DIAGNOSIS — M77.11 LATERAL EPICONDYLITIS OF RIGHT ELBOW: ICD-10-CM

## 2021-03-24 DIAGNOSIS — F17.210 CIGARETTE NICOTINE DEPENDENCE WITHOUT COMPLICATION: ICD-10-CM

## 2021-03-24 PROCEDURE — 99214 OFFICE O/P EST MOD 30 MIN: CPT | Performed by: FAMILY MEDICINE

## 2021-03-24 RX ORDER — MELOXICAM 15 MG/1
15 TABLET ORAL DAILY
Qty: 30 TABLET | Refills: 3 | Status: SHIPPED | OUTPATIENT
Start: 2021-03-24 | End: 2021-05-06 | Stop reason: ALTCHOICE

## 2021-03-24 SDOH — ECONOMIC STABILITY: FOOD INSECURITY: WITHIN THE PAST 12 MONTHS, THE FOOD YOU BOUGHT JUST DIDN'T LAST AND YOU DIDN'T HAVE MONEY TO GET MORE.: NEVER TRUE

## 2021-03-24 SDOH — ECONOMIC STABILITY: INCOME INSECURITY: HOW HARD IS IT FOR YOU TO PAY FOR THE VERY BASICS LIKE FOOD, HOUSING, MEDICAL CARE, AND HEATING?: NOT HARD AT ALL

## 2021-03-24 SDOH — ECONOMIC STABILITY: TRANSPORTATION INSECURITY
IN THE PAST 12 MONTHS, HAS THE LACK OF TRANSPORTATION KEPT YOU FROM MEDICAL APPOINTMENTS OR FROM GETTING MEDICATIONS?: NO

## 2021-03-24 ASSESSMENT — PATIENT HEALTH QUESTIONNAIRE - PHQ9
SUM OF ALL RESPONSES TO PHQ QUESTIONS 1-9: 0
SUM OF ALL RESPONSES TO PHQ QUESTIONS 1-9: 0
2. FEELING DOWN, DEPRESSED OR HOPELESS: 0

## 2021-03-24 NOTE — PROGRESS NOTES
Subjective:      Patient ID: Berna Gerebr 40 y.o. female. is here for evaluation for shoulder pain. HPI    Noel Rose complains of right shoulder pain x 3 - 4  months. She thinks it started from being understaffed at work and having to do more physical pt care but she does not recall a specific injury. She was doing back yard boot camp when the pain started - again no specific injury. The pain is from the elbow to the side of the neck. The worse pain is lateral shoulder. Hurts to abduct, internally rotate. Feels  is slightly weak on the right. No numbness in the arm. Rx: Lidocaine patch and Ibuprofen 800 mg help some. Nicotine dependence:  Smoking about a pack a day. Not ready to quit. Outpatient Medications Marked as Taking for the 3/24/21 encounter (Office Visit) with Sarah Astorga MD   Medication Sig Dispense Refill    lidocaine (LIDODERM) 5 % Place 1 patch onto the skin daily 12 hours on, 12 hours off. 30 patch 0    ibuprofen (ADVIL;MOTRIN) 200 MG tablet Take 400 mg by mouth every 6 hours as needed for Pain Took 800 mg at 1730 today 03/19/2021          Allergies   Allergen Reactions    Bactrim [Sulfamethoxazole-Trimethoprim] Other (See Comments)     Flu like symptoms       Patient Active Problem List   Diagnosis    Hirsutism    Edema    Cigarette nicotine dependence without complication       Past Medical History:   Diagnosis Date    Acute bronchitis     BV (bacterial vaginosis) 10/20/2016    COVID-19 11/2020    Edema     Hirsutism     Obesity     Shingles 7/29/2011       History reviewed. No pertinent surgical history.      Family History   Problem Relation Age of Onset    Osteoarthritis Mother     Hypertension Mother     Diabetes Mother     Ulcerative Colitis Mother     Colon Polyps Mother     Elevated Lipids Mother     Hypertension Father     Elevated Lipids Father     Heart Attack Father 61    Deep Vein Thrombosis Brother         wtih PE; no trigger Social History     Tobacco Use    Smoking status: Current Every Day Smoker     Packs/day: 0.75     Years: 22.00     Pack years: 16.50     Types: Cigarettes    Smokeless tobacco: Never Used   Substance Use Topics    Alcohol use: Yes     Comment: social    Drug use: No            Review of Systems  Review of Systems    Objective:   Physical Exam  Vitals:    03/24/21 1420   BP: 133/80   Pulse: 95   Resp: 12   Temp: 98.3 °F (36.8 °C)   TempSrc: Temporal   SpO2: 96%   Weight: 273 lb 9.6 oz (124.1 kg)       Physical Exam  Constitutional:       General: She is not in acute distress. Appearance: She is well-developed. Cardiovascular:      Rate and Rhythm: Normal rate and regular rhythm. Pulses:           Radial pulses are 2+ on the right side. Heart sounds: Normal heart sounds. Pulmonary:      Effort: Pulmonary effort is normal.      Breath sounds: Normal breath sounds. Musculoskeletal:      Right shoulder: She exhibits tenderness. She exhibits normal range of motion, no bony tenderness, no swelling, no effusion, no crepitus, no deformity, no spasm, normal pulse and normal strength. Right elbow: She exhibits normal range of motion, no swelling, no effusion and no deformity. Tenderness found. Lateral epicondyle tenderness noted. No radial head, no medial epicondyle and no olecranon process tenderness noted. Cervical back: Normal.   Skin:     General: Skin is warm and dry. Assessment:       Diagnosis Orders   1. Rotator cuff tendonitis, right  meloxicam (MOBIC) 15 MG tablet    [facility] [place]   2. Lateral epicondylitis of right elbow  meloxicam (MOBIC) 15 MG tablet    [facility] [place]   3. Cigarette nicotine dependence without complication  Strategies to quit or cut back including medication addressed. Smoking cessation education information provided. Plan:      Side effects of current medications reviewed and questions answered.    Call or return to clinic prn if these symptoms worsen or fail to improve as anticipated.

## 2021-03-31 ENCOUNTER — HOSPITAL ENCOUNTER (OUTPATIENT)
Dept: PHYSICAL THERAPY | Age: 46
Setting detail: THERAPIES SERIES
Discharge: HOME | End: 2021-03-31
Payer: COMMERCIAL

## 2021-03-31 PROCEDURE — 97140 MANUAL THERAPY 1/> REGIONS: CPT

## 2021-03-31 PROCEDURE — 97162 PT EVAL MOD COMPLEX 30 MIN: CPT

## 2021-03-31 PROCEDURE — 97110 THERAPEUTIC EXERCISES: CPT

## 2021-03-31 PROCEDURE — 97530 THERAPEUTIC ACTIVITIES: CPT

## 2021-03-31 ASSESSMENT — PAIN DESCRIPTION - DESCRIPTORS: DESCRIPTORS: BURNING;TINGLING;ACHING

## 2021-03-31 ASSESSMENT — PAIN SCALES - GENERAL: PAINLEVEL_OUTOF10: 5

## 2021-03-31 NOTE — FLOWSHEET NOTE
[facility]  [address] [place] [address], [address]  Phone: [phone]   Fax: [phone]    Physical Therapy Treatment Note/ Progress Report:     Date:  3/31/2021    Patient Name:  Luisana Benson    :  1975  MRN: [id number]  Medical/Treatment Diagnosis Information:  · Diagnosis: M75.81 (ICD-10-CM) - Rotator cuff tendonitis, right; M77.11 (ICD-10-CM) - Lateral epicondylitis of right elbow  · Treatment Diagnosis: R shoulder pain  Insurance/Certification information:     Physician Information:  Referring Practitioner: Dr. Gwen Smith of care signed:    [x] Yes  [] No     Date of Patient follow up with Physician:      Progress Report: []  Yes  [x]  No      Date Range for reporting period:  Beginning:  3/31/21  Ending:      Progress report due (10 Rx/or 30 days whichever is less):      Recertification due (POC duration/ or 90 days whichever is less): 21     Visit # Insurance Allowable Auth Needed   1 Prior auth- TBD up to 20 combined [x]Yes   []No     RESTRICTIONS/PRECAUTIONS:   Latex Allergy:  [x]NO      []YES   Preferred Language for Healthcare:   [x]English       []other:  Functional Scale: 3/31/21 UEFS 50/80    Pain level:  4/10     SUBJECTIVE:  See eval    OBJECTIVE: See eval   Observation:    Test measurements:       Exercises/Interventions: Exercises in bold performed in department today. Items not bolded are carried forward from prior visits for continuity of the record.     Exercise/Equipment Resistance/Repetitions HEP Other comments   Pendulums ant/post, horiz, CW, CCW 10x [x] Cues for foot placement, manual cues for bosy weight shifts   Shoulder rolls 10x [x]    Shoulder shrugs 10x [x]    Scapular retraction 10x [x]      []      []      []      []      []      []      []      []      []      []      []      []      []      []      Home Exercise Program:  Issued (see above)    Therapeutic Exercise and NMR:  [x] (80051) Provided verbal/tactile cueing for activities related to strengthening, flexibility, endurance, ROM  for improvements in scapular, scapulothoracic and UE control with self care, reaching, carrying, lifting, house/yardwork, driving/computer work. [x] (72144) Provided verbal/tactile cueing for activities related to improving balance, coordination, kinesthetic sense, posture, motor skill, proprioception  to assist with  scapular, scapulothoracic and UE control with self care, reaching, carrying, lifting, house/yardwork, driving/computer work. Therapeutic Activities:    [x] (04471) Provided verbal/tactile cueing for activities related to improving balance, coordination, kinesthetic sense, posture, motor skill, proprioception and motor activation to allow for proper function of scapular, scapulothoracic and UE control with self care, carrying, lifting, driving/computer work. Home Exercise Program:    [x] (91298) Reviewed/Progressed HEP activities related to strengthening, flexibility, endurance, ROM of scapular, scapulothoracic and UE control with self care, reaching, carrying, lifting, house/yardwork, driving/computer work. [x] (86551) Reviewed/Progressed HEP activities related to improving balance, coordination, kinesthetic sense, posture, motor skill, proprioception of scapular, scapulothoracic and UE control with self care, reaching, carrying, lifting, house/yardwork, driving/computer work. Manual Treatments:  PROM / STM / Oscillations-Mobs:  G-I, II, III, IV (PA's, Inf., Post.)  [x] (34666) Provided manual therapy to mobilize soft tissue/joints of cervical/CT, scapular GHJ and UE for the purpose of modulating pain, promoting relaxation,  increasing ROM, reducing/eliminating soft tissue swelling/inflammation/restriction, improving soft tissue extensibility and allowing for proper ROM for normal function with self care, reaching, carrying, lifting, house/yardwork, driving/computer work.      Modalities: [] Electric Stimulation:   [] Ultrasound:   [] Other:       Charges:  Timed Code Treatment Minutes: 40   Total Treatment Minutes: 55      [] EVAL (LOW) 90052 (typically 20 minutes face-to-face)  [x] EVAL (MOD) 84416 (typically 30 minutes face-to-face)  [] EVAL (HIGH) 64338 (typically 45 minutes face-to-face)  [] RE-EVAL     [x] NE(16140) x  20     [] NMR (47786) x       [x] Manual (51473) x    12   [x] TA (13974) x   8      [] ES(attended) (89920)   [] ES (un) (03172)   [] DRY NEEDLE 1 OR 2 MUSCLES  [] DRY NEEDLE 3+ MUSCLES  [] Mech Traction (35841)  [] Ultrasound (50321)  [] Other:    If BWC Please Indicate Time In/Out  CPT Code Time in Time out                                   GOALS:    Therapist goals for Patient:   Short Term Goals: To be achieved in: 2 weeks  1. Independent in HEP and progression per patient tolerance, in order to prevent re-injury. []? Progressing: []? Met: []? Not Met: []? Adjusted  2. Patient will have a decrease in pain to facilitate improvement in movement, function, and ADLs as indicated by Functional Deficits. []? Progressing: []? Met: []? Not Met: []? Adjusted     Long Term Goals: To be achieved in: 6 weeks  1. Score of 60/80 or higher on the UEFS to assist with reaching prior level of function. []? Progressing: []? Met: []? Not Met: []? Adjusted  2. Patient will demonstrate increased AROM to 160 degrees R shoulder flexion to allow for proper joint functioning as indicated by Functional Deficits. []? Progressing: []? Met: []? Not Met: []? Adjusted  3. Patient will demonstrate increased AROM to 160 degrees R shoulder abd to allow for proper joint functioning as indicated by Functional Deficits. []? Progressing: []? Met: []? Not Met: []? Adjusted  4. Patient will return to all work activities without increased symptoms or restriction. []? Progressing: []? Met: []? Not Met: []? Adjusted  5.  Pt will improve all R UE strength during MMT to >4/5 to allow for proper joint functioning as indicated by functional deficits. []? Progressing: []? Met: []? Not Met: []? Adjusted    ASSESSMENT:  Pt is  40 Y. O  female, presenting with c/o  R shoulder, elbow, and cervical pain. Assessment reveals deficits in strength, ROM, functional mobility, as well as increased pain. Pt will benefit from cont PT to address these deficits and promote return to highest level of functional independence. Treatment/Activity Tolerance:  [x] Patient tolerated treatment well [] Patient limited by fatique  [] Patient limited by pain  [] Patient limited by other medical complications  [] Other:     Overall Progression Towards Functional goals/ Treatment Progress Update:  [] Patient is progressing as expected towards functional goals listed. [] Progression is slowed due to complexities/Impairments listed. [] Progression has been slowed due to co-morbidities. [x] Plan just implemented, too soon to assess goals progression <30days   [] Goals require adjustment due to lack of progress  [] Patient is not progressing as expected and requires additional follow up with physician  [] Other    Prognosis for POC: [x] Good [] Fair  [] Poor    Patient requires continued skilled intervention: [x] Yes  [] No        PLAN: 2x/week for 6 weeks  [] Continue per plan of care [] Alter current plan (see comments)  [x] Plan of care initiated [] Hold pending MD visit [] Discharge    Electronically signed by: Denver Mcclellan PT    Note: If patient does not return for scheduled/recommended follow up visits, this note will serve as a discharge from care along with the most recent update on progress.

## 2021-03-31 NOTE — PROGRESS NOTES
[facility] Outpatient Therapy  [address], [address], [address]  Phone: [phone]   Fax: [phone]                                                       Connor Tillman    Dear  Referring Practitioner: Dr. Yolanda Hendrix,    We had the pleasure of evaluating the following patient for physical therapy services at [facility] ([facility]). A summary of our findings can be found in the initial assessment below. This includes our plan of care. If you have any questions or concerns regarding these findings, please do not hesitate to contact me at the office phone number checked above. Thank you for the referral.       Physician Signature:_______________________________Date:__________________  By signing above (or electronic signature), therapist's plan is approved by physician      Patient: Paul Canada   : 1975   MRN: [id number]  Referring Physician: Referring Practitioner: Dr. Yolanda Hendrix      Evaluation Date: 3/31/2021      Medical Diagnosis Information:  Diagnosis: M75.81 (ICD-10-CM) - Rotator cuff tendonitis, right; M77.11 (ICD-10-CM) - Lateral epicondylitis of right elbow   Treatment Diagnosis: R shoulder pain                                         Insurance information: Professor Omer [employer]     Precautions/ Contra-indications:   Latex Allergy:  [x]NO      []YES  Preferred Language for Healthcare:   [x]English       []other:  C-SSRS Triggered by Intake questionnaire (Past 2 wk assessment):   [x] No, Questionnaire did not trigger screening.   [] Yes, Patient intake triggered further evaluation      [] C-SSRS Screening completed  [] PCP notified via Plan of Care  [] Emergency services notified    SUBJECTIVE:  History of Present Illness:      Pt presents with c/o R shoulder pain. Pt reports most significant pain is in R deltoid area, but that is also radiates to lateral epicondyle and up to R cervical region.   Pt reports no significant injury, but that she has been having this pain for the past several weeks. She does note that she has had significantly increased physical labor at work d/t minimal assistance available and low staffing currently. 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 Pain       Patient reports pain is 4 /10 pain at present and 5 /10 pain at its worst.  Pain increases with: all mobility       Decreases with: use of lidocain patch, rest    Pain description:  Burning, tingling, radiating pain   Pt. reports pain with coughing, sneezing and laughing:   []Yes   [x]No    []NA     Current Functional Limitations:   [x]Yes   []No  Functional Complaints:    Difficulty reaching overhead, difficulty positioning R hand on steering wheel for driving, dec  on R  PLOF:   [x]No functional limitations   []Pre-exisiting limitations:  Pt's sleep is affected?    []Yes   [x]No        Social support/Environment:    Family/caregiver support:   [x]Yes   []No    Equipment:    []Rolling walker   []Standard walker   []Rollator   []Cody-walker  []Wheelchair   []Quad cane   []Straight cane  []Bedside commode  []Hospital bed  Other:       Relevant Medical History:     Co-morbidities/Complexities (which will affect course of rehabilitation):   []None           Arthritic conditions   []Rheumatoid arthritis (M05.9)  []Osteoarthritis (M19.91)   Cardiovascular conditions   []Hypertension (I10)  []Hyperlipidemia (E78.5)  []Angina pectoris (I20)  []Atherosclerosis (I70)   Musculoskeletal conditions   []Disc pathology   []Congenital spine pathologies   []Prior surgical intervention  []Osteoporosis (M81.8)  []Osteopenia (M85.8)   Endocrine conditions   []Hypothyroid (E03.9)  []Hyperthyroid Gastrointestinal conditions   []Constipation (L66.65)   Metabolic conditions   [x]Morbid obesity (E66.01)  []Diabetes type 1(E10.65) or 2 (E11.65)   []Neuropathy (G60.9)     Pulmonary conditions   []Asthma (J45)  []Coughing   []COPD (J44.9)   Psychological Disorders  []Anxiety (F41.9)  []Depression (F32.9)   []Other:   [x]Other:  smoking         Occupation/School: works at nursing facility    Sports/Hobbies/Recreational Activities: none stated       OBJECTIVE:     Functional Scale/Score: UEFS: 50/80      Gait/Steps/Balance       [x]Gait WNL                           []Deviations on a level linoleum surface include:      Posture: [x]WNL  []Forward head  []Forward flexed trunk   []Pronounced CT junction    []Increased thoracic kyphosis   []Scoliosis  []Scapular winging  []Decreased WB on   []R   []L     []Other:       Range of Motion  [x]All [facility]  []Cervical Spine   []Thoracic Spine     ROM Deficits Identified             *Denotes pain AROM              PROM MMT/Resisted Tests   Flexion      Extension      Sidebending Left      Sidebending Right      Rotation Left      Rotation Right          UE Range of Motion/Strength Testing      [x]All ROM WFL except as marked below   [x]All strength WFL (5/5) except as marked below    [x]All myotomes WFL (5/5) except as marked below      Range Tested MMT / Resisted PROM AROM Comments   *denotes pain Left Right Left Right Left Right    Cervical Flexion C1-2          Cervical Sidebend  C3          Shoulder Shrug  C4          Shoulder Flexion  2  30      Shoulder Extension          Shoulder Abduction  C5  2+  85      Shoulder Adduction           Shoulder IR    55      Shoulder ER    10      Elbow Flexion  C6    120      Elbow Extension C7    0      Pronation          Supination          Wrist Flexion C7          Wrist Extension C6          Radial Deviation          Ulnar Deviation                     Thumb Ext C8          Intrinsics T1              Scapular Strength    []All WFL (5/5) except as marked below   [x]NT     Scapula Left Right   Upper Trapezius     Middle Trapezius     Lower Trapezius     Rhomboid     Serratus Anterior       Latissimus Dorsi          Special Tests- UE    Special Test Abnormal Findings   Empty can test/supraspinatus []Neg   [x]Pos R   []Pos L      Drop arm test [x]Neg   []Pos R   []Pos L      Neer's impingement []Neg   [x]Pos R   []Pos L      Speed's test  []Neg   []Pos R   []Pos L      Elbow varus stress []Neg   []Pos R   []Pos L      Elbow valgus stress  []Neg   []Pos R   []Pos L      Tinel's sign  []Neg   []Pos R   []Pos L     []Elbow   []Wrist    Finkelstein's test []Neg   []Pos R   []Pos L      Phalen's test  []Neg   []Pos R   []Pos L      Hawkin's-Jack []Neg   [x]Pos R   []Pos L      Other  []Neg   []Pos R   []Pos L        Palpation     Patient reported tenderness with palpation:  [x]Yes   []No   []NA  Location:  R upper trap, cervical and posterior shoulder musculature  PT notes warmth:  []Yes   [x]No   []NA  Location:    PT notes increased muscle tone:   [x]Yes   []No   []NA  Location: R upper trap, cervical and posterior shoulder musculature  PT notes crepitus with palpation:   [x]Yes   []No   []NA  Location: R upper trap  Ligament tenderness/provocation:   [x]Yes   []No   []NA  Location:  R upper trap, cervical and posterior shoulder musculature  PT notes decreased scar mobility:   []Yes   []No   [x]NA    Appearance    PT notes swelling:   []Yes   [x]No   []NA  Location:     PT notes redness:   []Yes   [x]No   []NA  Location:   PT notes drainage:  []Yes   [x]No   []NA  Location:        Deep Tendon Reflexes:  Not tested      Dermatomal Sensation: not tested        Flexibility:      Bandages/Dressings/Incisions: N/A                         [x] Patient history, allergies, meds reviewed. Medical chart reviewed. See intake form. Review Of Systems (ROS):  [x]Performed Review of systems (Integumentary, CardioPulmonary, Neurological) by intake and observation. Intake form has been scanned into medical record. Patient has been instructed to contact their primary care physician regarding ROS issues if not already being addressed at this time.       Barriers to/and or personal factors that will affect rehab potential:              []Age  []Sex    [x]Smoker              []Motivation/Lack of Motivation                        []Co-Morbidities              []Cognitive Function, education/learning barriers              []Environmental, home barriers              [x]profession/work barriers  []past PT/medical experience  []other:  Justification:     Falls Risk Assessment (30 days):   [x] Falls Risk assessed and no intervention required. [] Falls Risk assessed and Patient requires intervention due to being higher risk   TUG score (>12s at risk):     [] Falls education provided, including:         ASSESSMENT: Pt is  40 Y. O  female, presenting with c/o  R shoulder, elbow, and cervical pain. Assessment reveals deficits in strength, ROM, functional mobility, as well as increased pain. Pt will benefit from cont PT to address these deficits and promote return to highest level of functional independence.       Functional Impairments:     []Noted spinal or UE joint hypomobility   []Noted spinal or UE joint hypermobility   [x]Decreased spinal/UE functional ROM   []Abnormal reflexes/sensation/myotomal/dermatomal deficits   [x]Decreased RC/scapular/core strength and neuromuscular control    [x]Decreased UE functional strength   []other:       Functional Activity Limitations (from functional questionnaire and intake)   []Reduced ability to tolerate prolonged functional positions   []Reduced ability or difficulty with changes of positions or transfers between positions   [x]Reduced ability to maintain good posture and demonstrate good body mechanics with sitting, bending, and lifting   [x] Reduced ability or tolerance with driving and/or computer work   [x]Reduced ability to perform lifting, reaching, carrying tasks   [x]Reduced ability to reach behind back   [x]Reduced ability to sleep    []Reduced ability to tolerate any impact through UE or spine   [x]Reduced ability to  or hold objects   [x]Reduced ability to throw or toss an object   []other:     Participation Restrictions   []Reduced participation in self care activities   [x]Reduced participation in home management activities   [x]Reduced participation in work activities   []Reduced participation in social activities. []Reduced participation in sport/recreational activities. Classification :     []signs/symptoms consistent with post-surgical status including decreased ROM, strength and function.     []signs/symptoms consistent with joint sprain/strain    [x]signs/symptoms consistent with shoulder impingement (internal, external, primary or secondary)   [x]signs/symptoms consistent with shoulder/elbow/wrist tendinopathy   []Signs/symptoms consistent with Rotator cuff tear   []sign/symptoms consistent with labral tear   []signs/symptoms consistent with rib dysfunction   []signs/symptoms consistent with postural dysfunction   []signs/symptoms consistent with Glenohumeral IR Deficit - <45 degrees   []signs/symptoms consistent with facet dysfunction of cervical/thoracic spine   []signs/symptoms consistent with pathology which may benefit from Dry Needling   []signs/symptoms which may limit the use of advanced manual therapy techniques: (Elevated CV risk profile, recent trauma, intolerance to end range positions, prior TIA, visual issues, UE neurological compromise )    Prognosis/Rehab Potential:      []Excellent   [x]Good    []Fair   []Poor    Tolerance of evaluation/treatment:    []Excellent   []Good    [x]Fair   []Poor     Physical Therapy Evaluation Complexity Justification  [x] A history of present problem with:  [] no personal factors and/or comorbidities that impact the plan of care;  [x]1-2 personal factors and/or comorbidities that impact the plan of care  []3 personal factors and/or comorbidities that impact the plan of care  [x] An examination of body systems using standardized tests and measures addressing any of the following: body structures and functions (impairments), activity limitations, and/or participation restrictions;:  [] a total of 1-2 or more elements   [] a total of 3 or more elements   [x] a total of 4 or more elements   [x] A clinical presentation with:  [] stable and/or uncomplicated characteristics   [x] evolving clinical presentation with changing characteristics  [] unstable and unpredictable characteristics;   [x] Clinical decision making of [] low, [] moderate, [] high complexity using standardized patient assessment instrument and/or measurable assessment of functional outcome. [] EVAL (LOW) 86840 (typically 20 minutes face-to-face)  [x] EVAL (MOD) 68866 (typically 30 minutes face-to-face)  [] EVAL (HIGH) 33003 (typically 45 minutes face-to-face)  [] RE-EVAL    PLAN:  Frequency/Duration:  2 days per week for 6 Weeks:  Interventions:  [x]  Therapeutic exercise including: strength training, ROM, for scapula, core and Upper extremity, including postural re-education. [x]  NMR activation and proprioception for UE, periscapular and RC muscles and Core, including postural re-education. [x]  Manual therapy as indicated for shoulder, scapula, spine and associated soft tissue including: Dry Needling/IASTM, STM, PROM, Gr I-IV mobilizations, manipulation. [x] Modalities as needed that may include: thermal agents, E-stim, Biofeedback, US, iontophoresis as indicated  [x] Patient education on joint protection, postural re-education, activity modification, progression of HEP. HEP instruction:     Pendulums (horiz, ant/post, CW, CCW)   Shoulder rolls   Shoulder shrugs   Scapular retraction     GOALS:  Patient stated goal: to be pain free  [] Progressing: [] Met: [] Not Met: [] Adjusted    Therapist goals for Patient:   Short Term Goals: To be achieved in: 2 weeks  1. Independent in HEP and progression per patient tolerance, in order to prevent re-injury. [] Progressing: [] Met: [] Not Met: [] Adjusted  2.  Patient will have a decrease in pain to facilitate improvement in movement, function, and ADLs as indicated by Functional Deficits. [] Progressing: [] Met: [] Not Met: [] Adjusted    Long Term Goals: To be achieved in: 6 weeks  1. Score of 60/80 or higher on the UEFS to assist with reaching prior level of function. [] Progressing: [] Met: [] Not Met: [] Adjusted  2. Patient will demonstrate increased AROM to 160 degrees R shoulder flexion to allow for proper joint functioning as indicated by Functional Deficits. [] Progressing: [] Met: [] Not Met: [] Adjusted  3. Patient will demonstrate increased AROM to 160 degrees R shoulder abd to allow for proper joint functioning as indicated by Functional Deficits. [] Progressing: [] Met: [] Not Met: [] Adjusted  4. Patient will return to all work activities without increased symptoms or restriction. [] Progressing: [] Met: [] Not Met: [] Adjusted  5. Pt will improve all R UE strength during MMT to >4/5 to allow for proper joint functioning as indicated by functional deficits.    [] Progressing: [] Met: [] Not Met: [] Adjusted      Electronically signed by:  Denver Mcclellan PT

## 2021-04-08 ENCOUNTER — HOSPITAL ENCOUNTER (OUTPATIENT)
Dept: PHYSICAL THERAPY | Age: 46
Setting detail: THERAPIES SERIES
Discharge: HOME | End: 2021-04-08
Payer: COMMERCIAL

## 2021-04-08 ENCOUNTER — TELEPHONE (OUTPATIENT)
Dept: FAMILY MEDICINE CLINIC | Age: 46
End: 2021-04-08

## 2021-04-08 DIAGNOSIS — M77.8 TENDINITIS OF SHOULDER, RIGHT: Primary | ICD-10-CM

## 2021-04-08 PROCEDURE — 97140 MANUAL THERAPY 1/> REGIONS: CPT

## 2021-04-08 PROCEDURE — 97110 THERAPEUTIC EXERCISES: CPT

## 2021-04-08 NOTE — FLOWSHEET NOTE
[facility]patient [facility]  [address] [facility] [place] [address], [address] [place] [address]  Phone: [phone]   Fax: [phone]    Physical Therapy Treatment Note/ Progress Report:     Date:  2021    Patient Name:  Guadalupe Ruth    :  1975  MRN: [id number]  Medical/Treatment Diagnosis Information:  ·   Diagnosis: M75.81 (ICD-10-CM) - Rotator cuff tendonitis, right; M77.11 (ICD-10-CM) - Lateral epicondylitis of right elbow  Treatment Diagnosis: R shoulder pain      Insurance/Certification information:     Physician Information:    Referring Practitioner: Dr. Ladarius Dumont of care signed:    [x] Yes  [] No     Date of Patient follow up with Physician:      Progress Report: []  Yes  [x]  No      Date Range for reporting period:  Beginning:  3/31/21  Ending:      Progress report due (10 Rx/or 30 days whichever is less): 3/71/81     Recertification due (POC duration/ or 90 days whichever is less): 21     Visit # Insurance Allowable Auth Needed   2 Prior auth- TBD up to 20 combined [x]Yes   []No     RESTRICTIONS/PRECAUTIONS:   Latex Allergy:  [x]NO      []YES   Preferred Language for Healthcare:   [x]English       []other:  Functional Scale: 3/31/21 UEFS 50/80    Pain level:  4/10     SUBJECTIVE:  Pt reports continued pain and weakness to R shoulder. Pt continues to report working long hours, 7 days/week.     OBJECTIVE:    Observation:    Test measurements:       Exercises/Interventions:     Exercise/Equipment Resistance/Repetitions HEP Other comments   Pendulums ant/post, horiz, CW, CCW 10x [x] Cues for foot placement, manual cues for bosy weight shifts   Shoulder rolls 10x [x]    Shoulder shrugs 10x [x]    Scapular retraction 10x [x] Demonstration and cues provided for appropriate technique   Towel slides flex 10x [x] Demonstration and cues provided for appropriate technique   Towel slides abd  10x [x] Demonstration and cues provided for appropriate technique     [] []      []      []      []      []      []      []      []      []      []      []      Home Exercise Program:  Issued (see above)      Access Code: [email or web address]: [email or web address] com/Date: 04/08/2021Prepared by: Franchot Space   Seated Shoulder Flexion Towel Slide at Table Top - 2 x daily - 7 x weekly - 1 sets - 10 reps   Seated Shoulder Abduction Towel Slide at Table Top - 2 x daily - 7 x weekly - 1 sets - 10 reps      Access Code: [email or web address]: [email or web address] com/Date: 04/08/2021Prepared by: [employer]   Seated Shoulder Flexion Towel Slide at Table Top - 2 x daily - 7 x weekly - 1 sets - 10 reps   Seated Shoulder Abduction Towel Slide at Table Top - 2 x daily - 7 x weekly - 1 sets - 10 reps   Seated Shoulder Abduction AAROM with Pulley Behind - 1 x daily - 7 x weekly - 10 reps - 3 sets   Seated Shoulder Scaption AAROM with Pulley at Side - 1 x daily - 7 x weekly - 10 reps - 3 sets   Seated Shoulder Flexion AAROM with Pulley Behind - 1 x daily - 7 x weekly - 10 reps - 3 sets      Therapeutic Exercise and NMR:  [x] (84568) Provided verbal/tactile cueing for activities related to strengthening, flexibility, endurance, ROM  for improvements in scapular, scapulothoracic and UE control with self care, reaching, carrying, lifting, house/yardwork, driving/computer work. [x] (96118) Provided verbal/tactile cueing for activities related to improving balance, coordination, kinesthetic sense, posture, motor skill, proprioception  to assist with  scapular, scapulothoracic and UE control with self care, reaching, carrying, lifting, house/yardwork, driving/computer work.     Therapeutic Activities:    [] (64997) Provided verbal/tactile cueing for activities related to improving balance, coordination, kinesthetic sense, posture, motor skill, proprioception and motor activation to allow for proper function of scapular, scapulothoracic and UE control with self care, carrying, lifting, driving/computer work. Home Exercise Program:    [x] (70420) Reviewed/Progressed HEP activities related to strengthening, flexibility, endurance, ROM of scapular, scapulothoracic and UE control with self care, reaching, carrying, lifting, house/yardwork, driving/computer work. [x] (52309) Reviewed/Progressed HEP activities related to improving balance, coordination, kinesthetic sense, posture, motor skill, proprioception of scapular, scapulothoracic and UE control with self care, reaching, carrying, lifting, house/yardwork, driving/computer work. Manual Treatments:  PROM / STM / Oscillations-Mobs:  G-I, II, III, IV (PA's, Inf., Post.)  [x] (64125) Provided manual therapy to mobilize soft tissue/joints of cervical/CT, scapular GHJ and UE for the purpose of modulating pain, promoting relaxation,  increasing ROM, reducing/eliminating soft tissue swelling/inflammation/restriction, improving soft tissue extensibility and allowing for proper ROM for normal function with self care, reaching, carrying, lifting, house/yardwork, driving/computer work. 15 minutes manual stretching provided per therapist for R shoulder flex, abd, IR, ER      Modalities:    [] Electric Stimulation:   [] Ultrasound:   [] Other:       Charges:  Timed Code Treatment Minutes: 40   Total Treatment Minutes: 40      [] EVAL (LOW) 09555 (typically 20 minutes face-to-face)  [] EVAL (MOD) 58187 (typically 30 minutes face-to-face)  [] EVAL (HIGH) 45805 (typically 45 minutes face-to-face)  [] RE-EVAL     [x] KP(48829) x  25     [] NMR (37765) x       [x] Manual (29134) x    15   [] TA (89877) x         [] ES(attended) (70216)   [] ES (un) (96236)   [] DRY NEEDLE 1 OR 2 MUSCLES  [] DRY NEEDLE 3+ MUSCLES   [] Mech Traction (94024)  [] Ultrasound (18805)  [] Other:      GOALS:    Therapist goals for Patient:   Short Term Goals: To be achieved in: 2 weeks  1.  Independent in HEP and progression per patient tolerance, in order to prevent re-injury. []? Progressing: []? Met: []? Not Met: []? Adjusted  2. Patient will have a decrease in pain to facilitate improvement in movement, function, and ADLs as indicated by Functional Deficits. []? Progressing: []? Met: []? Not Met: []? Adjusted     Long Term Goals: To be achieved in: 6 weeks  1. Score of 60/80 or higher on the UEFS to assist with reaching prior level of function. []? Progressing: []? Met: []? Not Met: []? Adjusted  2. Patient will demonstrate increased AROM to 160 degrees R shoulder flexion to allow for proper joint functioning as indicated by Functional Deficits. []? Progressing: []? Met: []? Not Met: []? Adjusted  3. Patient will demonstrate increased AROM to 160 degrees R shoulder abd to allow for proper joint functioning as indicated by Functional Deficits. []? Progressing: []? Met: []? Not Met: []? Adjusted  4. Patient will return to all work activities without increased symptoms or restriction. []? Progressing: []? Met: []? Not Met: []? Adjusted  5. Pt will improve all R UE strength during MMT to >4/5 to allow for proper joint functioning as indicated by functional deficits. []? Progressing: []? Met: []? Not Met: []? Adjusted    ASSESSMENT:  Pt is  40 Y. O  female, presenting with c/o  R shoulder, elbow, and cervical pain. Pt demonstrates significant limitations in R shoulder ROM this date. Does show improvement with sustained stretching and definite improvement from initial evaluation. Continue to educate pt to rest shoulder, but pt continues to work long hours of manual work. Will continue to focus on increased ROM and progress towards strengthening as appropriate. Good tolerance for newly added exercises this date. Pt will benefit from cont PT to address these deficits and promote return to highest level of functional independence.         Treatment/Activity Tolerance:  [x] Patient tolerated treatment well [] Patient limited by sera  [] Patient limited by pain  [] Patient limited by other medical complications  [] Other:     Overall Progression Towards Functional goals/ Treatment Progress Update:  [] Patient is progressing as expected towards functional goals listed. [] Progression is slowed due to complexities/Impairments listed. [] Progression has been slowed due to co-morbidities. [x] Plan just implemented, too soon to assess goals progression <30days   [] Goals require adjustment due to lack of progress  [] Patient is not progressing as expected and requires additional follow up with physician  [] Other    Prognosis for POC: [x] Good [] Fair  [] Poor    Patient requires continued skilled intervention: [x] Yes  [] No        PLAN: 2x/week for 6 weeks  [x] Continue per plan of care [] Alter current plan (see comments)  [] Plan of care initiated [] Hold pending MD visit [] Discharge    Electronically signed by: Angela Brown PT    Note: If patient does not return for scheduled/recommended follow up visits, this note will serve as a discharge from care along with the most recent update on progress.

## 2021-04-08 NOTE — TELEPHONE ENCOUNTER
Refer to Dr. Paco Perez, Natasha Fraga MD  [address] [place], [place] [address]mago Carranza.   [place], [address]  [phone]     Thanks

## 2021-04-12 ENCOUNTER — HOSPITAL ENCOUNTER (OUTPATIENT)
Dept: PHYSICAL THERAPY | Age: 46
Setting detail: THERAPIES SERIES
Discharge: HOME | End: 2021-04-12
Payer: COMMERCIAL

## 2021-04-12 PROCEDURE — 97035 APP MDLTY 1+ULTRASOUND EA 15: CPT

## 2021-04-12 PROCEDURE — 97110 THERAPEUTIC EXERCISES: CPT

## 2021-04-12 PROCEDURE — 97140 MANUAL THERAPY 1/> REGIONS: CPT

## 2021-04-12 PROCEDURE — G0283 ELEC STIM OTHER THAN WOUND: HCPCS

## 2021-04-12 NOTE — FLOWSHEET NOTE
[facility]  [address] [place] [address] DONA Abdi [address], [address]  Phone: [phone]   Fax: [phone]    Physical Therapy Treatment Note/ Progress Report:     Date:  2021    Patient Name:  Basilio Saeed    :  1975  MRN: [id number]  Medical/Treatment Diagnosis Information:  ·   Diagnosis: M75.81 (ICD-10-CM) - Rotator cuff tendonitis, right; M77.11 (ICD-10-CM) - Lateral epicondylitis of right elbow  Treatment Diagnosis: R shoulder pain      Insurance/Certification information:     Physician Information:    Referring Practitioner: Dr. Juan Adame of [facility] signed:    [x] Yes  [] No     Date of Patient follow up with Physician:      Progress Report: []  Yes  [x]  No      Date Range for reporting period:  Beginning:  3/31/21  Ending:      Progress report due (10 Rx/or 30 days whichever is less):      Recertification due (POC duration/ or 90 days whichever is less): 21     Visit # Insurance Allowable Auth Needed   3 Prior auth- TBD up to 20 combined [x]Yes   []No     RESTRICTIONS/PRECAUTIONS:   Latex Allergy:  [x]NO      []YES   Preferred Language for Healthcare:   [x]English       []other:  Functional Scale: 3/31/21 UEFS 50/80    Pain level:  6/10     SUBJECTIVE:  Pt reports flared up pain after last visit which calmed down with rest.  Pt now reports again increased pain after working the weekend. Pt reports it feels stiff today.   Pt also reports tightness and soreness in neck    OBJECTIVE:    Observation:    Test measurements:        Exercises/Interventions:     Exercise/Equipment Resistance/Repetitions HEP Other comments   Pendulums ant/post, horiz, CW, CCW 10x [x] Cues for foot placement, manual cues for bosy weight shifts   Shoulder rolls 10x [x]    Shoulder shrugs 10x [x]    Scapular retraction 10x [x] Demonstration and cues provided for appropriate technique   Towel slides flex 10x [x] Demonstration and cues provided for appropriate technique   Towel slides abd  10x [x] Demonstration and cues provided for appropriate technique     []      []      []      []      []      []      []      []      []      []      []      []      Home Exercise Program:  Issued (see above)      Access Code: [email or web address]: [email or web address] com/Date: 04/08/2021Prepared by: Eleazar Solorio   Seated Shoulder Flexion Towel Slide at Table Top - 2 x daily - 7 x weekly - 1 sets - 10 reps   Seated Shoulder Abduction Towel Slide at Table Top - 2 x daily - 7 x weekly - 1 sets - 10 reps      Access Code: [email or web address]: [email or web address] com/Date: 04/08/2021Prepared by: Eleazar Solorio   Seated Shoulder Flexion Towel Slide at Table Top - 2 x daily - 7 x weekly - 1 sets - 10 reps   Seated Shoulder Abduction Towel Slide at Table Top - 2 x daily - 7 x weekly - 1 sets - 10 reps   Seated Shoulder Abduction AAROM with Pulley Behind - 1 x daily - 7 x weekly - 10 reps - 3 sets   Seated Shoulder Scaption AAROM with Pulley at Side - 1 x daily - 7 x weekly - 10 reps - 3 sets   Seated Shoulder Flexion AAROM with Pulley Behind - 1 x daily - 7 x weekly - 10 reps - 3 sets      Therapeutic Exercise and NMR:  [x] (54167) Provided verbal/tactile cueing for activities related to strengthening, flexibility, endurance, ROM  for improvements in scapular, scapulothoracic and UE control with self care, reaching, carrying, lifting, house/yardwork, driving/computer work. [x] (32508) Provided verbal/tactile cueing for activities related to improving balance, coordination, kinesthetic sense, posture, motor skill, proprioception  to assist with  scapular, scapulothoracic and UE control with self care, reaching, carrying, lifting, house/yardwork, driving/computer work.     Therapeutic Activities:    [] (37908) Provided verbal/tactile cueing for activities related to improving balance, coordination, kinesthetic sense, posture, motor skill, proprioception and motor activation to allow for proper function of scapular, scapulothoracic and UE control with self care, carrying, lifting, driving/computer work. Home Exercise Program:    [x] (46465) Reviewed/Progressed HEP activities related to strengthening, flexibility, endurance, ROM of scapular, scapulothoracic and UE control with self care, reaching, carrying, lifting, house/yardwork, driving/computer work. [x] (46710) Reviewed/Progressed HEP activities related to improving balance, coordination, kinesthetic sense, posture, motor skill, proprioception of scapular, scapulothoracic and UE control with self care, reaching, carrying, lifting, house/yardwork, driving/computer work. Manual Treatments:  PROM / STM / Oscillations-Mobs:  G-I, II, III, IV (PA's, Inf., Post.)  [x] (51230) Provided manual therapy to mobilize soft tissue/joints of cervical/CT, scapular GHJ and UE for the purpose of modulating pain, promoting relaxation,  increasing ROM, reducing/eliminating soft tissue swelling/inflammation/restriction, improving soft tissue extensibility and allowing for proper ROM for normal function with self care, reaching, carrying, lifting, house/yardwork, driving/computer work.      20 minutes manual stretching provided per therapist for R shoulder flex, abd, IR, ER      Modalities:    [] Electric Stimulation:   [] Ultrasound:   [] Other:       Charges:  Timed Code Treatment Minutes: 8 min US, 12 min E-stim, 20 min manual, 18 min TE   Total Treatment Minutes: 58      [] EVAL (LOW) 15673 (typically 20 minutes face-to-face)  [] EVAL (MOD) 56791 (typically 30 minutes face-to-face)  [] EVAL (HIGH) 30535 (typically 45 minutes face-to-face)  [] RE-EVAL     [x] KU(48124) x  1     [] NMR (79620) x       [x] Manual (99166) x    1   [] TA (14993) x         [] ES(attended) (91218)   [x] ES (un) (61388)  1  [] DRY NEEDLE 1 OR 2 MUSCLES  [] DRY NEEDLE 3+ MUSCLES   [] Mech Traction (95610)  [x] Ultrasound (62707) 1  [] Other:      GOALS:    Therapist goals for Patient:   Short Term Goals: To be achieved in: 2 weeks  1. Independent in HEP and progression per patient tolerance, in order to prevent re-injury. []? Progressing: []? Met: []? Not Met: []? Adjusted  2. Patient will have a decrease in pain to facilitate improvement in movement, function, and ADLs as indicated by Functional Deficits. []? Progressing: []? Met: []? Not Met: []? Adjusted     Long Term Goals: To be achieved in: 6 weeks  1. Score of 60/80 or higher on the UEFS to assist with reaching prior level of function. []? Progressing: []? Met: []? Not Met: []? Adjusted  2. Patient will demonstrate increased AROM to 160 degrees R shoulder flexion to allow for proper joint functioning as indicated by Functional Deficits. []? Progressing: []? Met: []? Not Met: []? Adjusted  3. Patient will demonstrate increased AROM to 160 degrees R shoulder abd to allow for proper joint functioning as indicated by Functional Deficits. []? Progressing: []? Met: []? Not Met: []? Adjusted  4. Patient will return to all work activities without increased symptoms or restriction. []? Progressing: []? Met: []? Not Met: []? Adjusted  5. Pt will improve all R UE strength during MMT to >4/5 to allow for proper joint functioning as indicated by functional deficits. []? Progressing: []? Met: []? Not Met: []? Adjusted    ASSESSMENT:  Pt is  40 Y. O  female, presenting with c/o  R shoulder, elbow, and cervical pain. Pt continues to demonstrate ROM limitations with manual stretching. Pt demonstrates more limitations in PROM R shoulder flex when supine, but able to tolerate near full PROM R shoulder flex with towel slides on mat table and sidelying. Pt will benefit from cont PT to address these deficits and promote return to highest level of functional independence.         Treatment/Activity Tolerance:  [x] Patient tolerated treatment well [] Patient limited by sera  [] Patient limited by pain  [] Patient limited by other medical complications  [] Other:     Overall Progression Towards Functional goals/ Treatment Progress Update:  [] Patient is progressing as expected towards functional goals listed. [] Progression is slowed due to complexities/Impairments listed. [] Progression has been slowed due to co-morbidities. [x] Plan just implemented, too soon to assess goals progression <30days   [] Goals require adjustment due to lack of progress  [] Patient is not progressing as expected and requires additional follow up with physician  [] Other    Prognosis for POC: [x] Good [] Fair  [] Poor    Patient requires continued skilled intervention: [x] Yes  [] No        PLAN: 2x/week for 6 weeks  [x] Continue per plan of care [] Alter current plan (see comments)  [] Plan of care initiated [] Hold pending MD visit [] Discharge    Electronically signed by: Abdiaziz Matamoros PT    Note: If patient does not return for scheduled/recommended follow up visits, this note will serve as a discharge from care along with the most recent update on progress.

## 2021-04-14 ENCOUNTER — HOSPITAL ENCOUNTER (OUTPATIENT)
Dept: PHYSICAL THERAPY | Age: 46
Setting detail: THERAPIES SERIES
Discharge: HOME | End: 2021-04-14
Payer: COMMERCIAL

## 2021-04-14 PROCEDURE — 97140 MANUAL THERAPY 1/> REGIONS: CPT

## 2021-04-14 PROCEDURE — 97110 THERAPEUTIC EXERCISES: CPT

## 2021-04-14 PROCEDURE — G0283 ELEC STIM OTHER THAN WOUND: HCPCS

## 2021-04-14 NOTE — FLOWSHEET NOTE
[facility]  [address], [address] [facility], [address]  Phone: [phone]   Fax: [phone]    Physical Therapy Treatment Note/ Progress Report:     Date:  2021    Patient Name:  Dede Forde    :  1975  MRN: [id number]  Medical/Treatment Diagnosis Information:  ·   Diagnosis: M75.81 (ICD-10-CM) - Rotator cuff tendonitis, right; M77.11 (ICD-10-CM) - Lateral epicondylitis of right elbow  Treatment Diagnosis: R shoulder pain      Insurance/Certification information:     Physician Information:    Referring Practitioner: Dr. Iantha Sandhoff of care signed:    [x] Yes  [] No     Date of Patient follow up with Physician:      Progress Report: []  Yes  [x]  No      Date Range for reporting period:  Beginning:  3/31/21  Ending:      Progress report due (10 Rx/or 30 days whichever is less):      Recertification due (POC duration/ or 90 days whichever is less): 21     Visit # Insurance Allowable Auth Needed   4 Prior auth- 5 visits including initial evaluation (1 more visit available) [x]Yes   []No     RESTRICTIONS/PRECAUTIONS:   Latex Allergy:  [x]NO      []YES   Preferred Language for Healthcare:   [x]English       []other:  Functional Scale: 3/31/21 UEFS 50/80    Pain level:  4/10 with mobility, 2/10 rest     SUBJECTIVE:  Pt reports dropping bowl of apples when attempting to hand it to someone- reports minimal pain when this happened but some tingling and inability to maintain hold on bowl. Pt also reports feeling that she has had decreased pain which she attributes to addition of e-stim last visit.     OBJECTIVE:    Observation:    Test measurements:     R shoulder flexion PROM: initially only tolerated ~90 degrees but progressed to 180 degrees during next stretch attempt   R shoulder abd PROM: 140 degrees        Exercises/Interventions:     Exercise/Equipment Resistance/Repetitions HEP Other comments   Pendulums ant/post, horiz, Kaur Meiers 10x [x] Cues for foot placement, manual cues for bosy weight shifts   Shoulder rolls 10x [x]    Shoulder shrugs 10x [x]    Scapular retraction 10x [x] Demonstration and cues provided for appropriate technique   Towel slides flex 10x [x] Demonstration and cues provided for appropriate technique   Towel slides abd  10x [x] Demonstration and cues provided for appropriate technique     []      []      []      []      []      []      []      []      []      []      []      []      Home Exercise Program:  Issued (see above)      Access Code: [email or web address]: [email or web address] com/Date: 04/08/2021Prepared by: Lula Villeda   Seated Shoulder Flexion Towel Slide at Table Top - 2 x daily - 7 x weekly - 1 sets - 10 reps   Seated Shoulder Abduction Towel Slide at Table Top - 2 x daily - 7 x weekly - 1 sets - 10 reps      Access Code: [email or web address]: [email or web address] com/Date: 04/08/2021Prepared by: Lula Villeda   Seated Shoulder Flexion Towel Slide at Table Top - 2 x daily - 7 x weekly - 1 sets - 10 reps   Seated Shoulder Abduction Towel Slide at Table Top - 2 x daily - 7 x weekly - 1 sets - 10 reps   Seated Shoulder Abduction AAROM with Pulley Behind - 1 x daily - 7 x weekly - 10 reps - 3 sets   Seated Shoulder Scaption AAROM with Pulley at Side - 1 x daily - 7 x weekly - 10 reps - 3 sets   Seated Shoulder Flexion AAROM with Pulley Behind - 1 x daily - 7 x weekly - 10 reps - 3 sets      Therapeutic Exercise and NMR:  [x] (68692) Provided verbal/tactile cueing for activities related to strengthening, flexibility, endurance, ROM  for improvements in scapular, scapulothoracic and UE control with self care, reaching, carrying, lifting, house/yardwork, driving/computer work.     [x] (79128) Provided verbal/tactile cueing for activities related to improving balance, coordination, kinesthetic sense, posture, motor skill, proprioception  to assist with  scapular, scapulothoracic and UE control with self care, reaching, carrying, lifting, house/yardwork, driving/computer work. Therapeutic Activities:    [] (33710) Provided verbal/tactile cueing for activities related to improving balance, coordination, kinesthetic sense, posture, motor skill, proprioception and motor activation to allow for proper function of scapular, scapulothoracic and UE control with self care, carrying, lifting, driving/computer work. Home Exercise Program:    [x] (43049) Reviewed/Progressed HEP activities related to strengthening, flexibility, endurance, ROM of scapular, scapulothoracic and UE control with self care, reaching, carrying, lifting, house/yardwork, driving/computer work. [x] (72131) Reviewed/Progressed HEP activities related to improving balance, coordination, kinesthetic sense, posture, motor skill, proprioception of scapular, scapulothoracic and UE control with self care, reaching, carrying, lifting, house/yardwork, driving/computer work. Manual Treatments:  PROM / STM / Oscillations-Mobs:  G-I, II, III, IV (PA's, Inf., Post.)  [x] (92510) Provided manual therapy to mobilize soft tissue/joints of cervical/CT, scapular GHJ and UE for the purpose of modulating pain, promoting relaxation,  increasing ROM, reducing/eliminating soft tissue swelling/inflammation/restriction, improving soft tissue extensibility and allowing for proper ROM for normal function with self care, reaching, carrying, lifting, house/yardwork, driving/computer work.      10 minutes manual stretching provided per therapist for R shoulder flex, abd.  Gentle grade 1/2 long arm distraction of R UE with pt reporting relief      Modalities:    [x] Electric Stimulation: 10 min pre-mod e-stim to R shoulder  [] Ultrasound:   [] Other:       Charges:  Timed Code Treatment Minutes: 20 min TE, 10 min manual, 10 min e-stim   Total Treatment Minutes: 40      [] EVAL (LOW) 57101 (typically 20 minutes face-to-face)  [] EVAL (MOD) 47001 (typically tolerate more intensity, able to reach full shoulder flex PROM on the R. Continues to be limited in abd. Gentle grade 1/2 oscillations provided in R long arm distraction with pain relief. Good tolerance for therapeutic exercises. E-stim provided at end of session for increased pain relief as pt reports improvement after last session. Limited progression this date d/t pt late arrival. Pt will benefit from cont PT to address these deficits and promote return to highest level of functional independence. Treatment/Activity Tolerance:  [x] Patient tolerated treatment well [] Patient limited by fatique  [] Patient limited by pain  [] Patient limited by other medical complications  [] Other:     Overall Progression Towards Functional goals/ Treatment Progress Update:  [] Patient is progressing as expected towards functional goals listed. [x] Progression is slowed due to complexities/Impairments listed. [] Progression has been slowed due to co-morbidities. [] Plan just implemented, too soon to assess goals progression <30days   [] Goals require adjustment due to lack of progress  [] Patient is not progressing as expected and requires additional follow up with physician  [] Other    Prognosis for POC: [x] Good [] Fair  [] Poor    Patient requires continued skilled intervention: [x] Yes  [] No        PLAN: 2x/week for 6 weeks  [x] Continue per plan of care [] Alter current plan (see comments)  [] Plan of care initiated [] Hold pending MD visit [] Discharge    Electronically signed by: Dar Silveira PT    Note: If patient does not return for scheduled/recommended follow up visits, this note will serve as a discharge from care along with the most recent update on progress.

## 2021-04-22 ENCOUNTER — HOSPITAL ENCOUNTER (OUTPATIENT)
Dept: PHYSICAL THERAPY | Age: 46
Setting detail: THERAPIES SERIES
Discharge: HOME | End: 2021-04-22
Payer: COMMERCIAL

## 2021-04-22 PROCEDURE — 97140 MANUAL THERAPY 1/> REGIONS: CPT

## 2021-04-22 PROCEDURE — 97530 THERAPEUTIC ACTIVITIES: CPT

## 2021-04-22 PROCEDURE — 97110 THERAPEUTIC EXERCISES: CPT

## 2021-04-22 NOTE — PROGRESS NOTES
[facility]  [address], [address] [facility], [address] [place] [address]  Phone: [phone]   Fax: [phone]    Physical Therapy Treatment Note/ Progress Report:     Date:  2021    Patient Name:  Lidia Cloud    :  1975  MRN: [id number]  Medical/Treatment Diagnosis Information:  ·   Diagnosis: M75.81 (ICD-10-CM) - Rotator cuff tendonitis, right; M77.11 (ICD-10-CM) - Lateral epicondylitis of right elbow  Treatment Diagnosis: R shoulder pain      Insurance/Certification information:     Physician Information:    Referring Practitioner: Dr. Yesi Casper of care signed:    [x] Yes  [] No     Date of Patient follow up with Physician:  Dr. Vee Mari on 21     Progress Report: [x]  Yes  []  No      Date Range for reporting period:  Beginning:  3/31/21  Ending:      Progress report due (10 Rx/or 30 days whichever is less):      Recertification due (POC duration/ or 90 days whichever is less): 21     Visit # Insurance Allowable Auth Needed   5 Prior auth- 5 visits including initial evaluation (1 more visit available) [x]Yes   []No     RESTRICTIONS/PRECAUTIONS:   Latex Allergy:  [x]NO      []YES   Preferred Language for Healthcare:   [x]English       []other:  Functional Scale: 21 UEFS 59/80    Pain level:  4/10 with mobility, 2/10 rest     SUBJECTIVE:  Pt reports shoulder is still feeling about the same as when she started. She has an appointment with orthopedic surgeon next week. She is nervous about what he'll say. She doesn't want surgery. She will occasionally get burning down the arm and tingling in the fingers. Reaching behind back is very painful.     OBJECTIVE:    Observation:    Test measurements:    Right shoulder AROM: flex 150 deg, abd 90 deg, ER WNL, IR to L1  MMT: flex 3+/5, abd 3+/5 painful, ER 4+/5 IR 5/5  Cervical mobility: WNL all directions except retraction and extension 25% loss        Exercises/Interventions: Exercise/Equipment Resistance/Repetitions HEP Other comments   Repeated cervical retraction 2 x10 [x] No neck pain, shoulder pain mildly decreased   Repeated cervical retraction extension 4 x10 [x] No neck pain, shoulder pain decreased and abduction significantly increased (155 deg)     []      []      []      []      []      []    Manual: central and BPAs upper thoracic gr III/IV 10 min [] Further improvement in shoulder abduction (170 deg)     []      []      []      Home Exercise Program:  Issued (see above)      Access Code: [email or web address]: [email or web address] com/Date: 04/08/2021Prepared by: Cory Esquivel   Seated Shoulder Flexion Towel Slide at Table Top - 2 x daily - 7 x weekly - 1 sets - 10 reps   Seated Shoulder Abduction Towel Slide at Table Top - 2 x daily - 7 x weekly - 1 sets - 10 reps      Access Code: [email or web address]: [email or web address] com/Date: 04/08/2021Prepared by: Cory Esquivel   Seated Shoulder Flexion Towel Slide at Table Top - 2 x daily - 7 x weekly - 1 sets - 10 reps   Seated Shoulder Abduction Towel Slide at Table Top - 2 x daily - 7 x weekly - 1 sets - 10 reps   Seated Shoulder Abduction AAROM with Pulley Behind - 1 x daily - 7 x weekly - 10 reps - 3 sets   Seated Shoulder Scaption AAROM with Pulley at Side - 1 x daily - 7 x weekly - 10 reps - 3 sets   Seated Shoulder Flexion AAROM with Pulley Behind - 1 x daily - 7 x weekly - 10 reps - 3 sets      Therapeutic Exercise and NMR:  [x] (14484) Provided verbal/tactile cueing for activities related to strengthening, flexibility, endurance, ROM  for improvements in scapular, scapulothoracic and UE control with self care, reaching, carrying, lifting, house/yardwork, driving/computer work.    [] (24329) Provided verbal/tactile cueing for activities related to improving balance, coordination, kinesthetic sense, posture, motor skill, proprioception  to assist with  scapular, scapulothoracic and UE control with self care, reaching, carrying, lifting, house/yardwork, driving/computer work. Therapeutic Activities:    [x] (42503) Provided verbal/tactile cueing for activities related to improving balance, coordination, kinesthetic sense, posture, motor skill, proprioception and motor activation to allow for proper function of scapular, scapulothoracic and UE control with self care, carrying, lifting, driving/computer work. Home Exercise Program:    [x] (14088) Reviewed/Progressed HEP activities related to strengthening, flexibility, endurance, ROM of scapular, scapulothoracic and UE control with self care, reaching, carrying, lifting, house/yardwork, driving/computer work.   [] (56629) Reviewed/Progressed HEP activities related to improving balance, coordination, kinesthetic sense, posture, motor skill, proprioception of scapular, scapulothoracic and UE control with self care, reaching, carrying, lifting, house/yardwork, driving/computer work. Manual Treatments:  PROM / STM / Oscillations-Mobs:  G-I, II, III, IV (PA's, Inf., Post.)  [x] (91076) Provided manual therapy to mobilize soft tissue/joints of cervical/CT, scapular GHJ and UE for the purpose of modulating pain, promoting relaxation,  increasing ROM, reducing/eliminating soft tissue swelling/inflammation/restriction, improving soft tissue extensibility and allowing for proper ROM for normal function with self care, reaching, carrying, lifting, house/yardwork, driving/computer work.        Modalities:    [] Electric Stimulation: 10 min pre-mod e-stim to R shoulder  [] Ultrasound:   [] Other:       Charges:  Timed Code Treatment Minutes: 15 min TE, 10 min man, 15 TA   Total Treatment Minutes: 40      [] EVAL (LOW) 92726 (typically 20 minutes face-to-face)  [] EVAL (MOD) 20393 (typically 30 minutes face-to-face)  [] EVAL (HIGH) 93269 (typically 45 minutes face-to-face)  [] RE-EVAL     [x] SM(98918) x  1     [] NMR (06872) x       [x] Manual (61322) x    1   [x] TA these deficits and promote return to highest level of functional independence. Treatment/Activity Tolerance:  [x] Patient tolerated treatment well [] Patient limited by fatique  [] Patient limited by pain  [] Patient limited by other medical complications  [] Other:     Overall Progression Towards Functional goals/ Treatment Progress Update:  [] Patient is progressing as expected towards functional goals listed. [x] Progression is slowed due to complexities/Impairments listed. [] Progression has been slowed due to co-morbidities. [] Plan just implemented, too soon to assess goals progression <30days   [] Goals require adjustment due to lack of progress  [] Patient is not progressing as expected and requires additional follow up with physician  [] Other    Prognosis for POC: [x] Good [] Fair  [] Poor    Patient requires continued skilled intervention: [x] Yes  [] No        PLAN: 2x/week for 6 weeks  [x] Continue per plan of care [] Alter current plan (see comments)  [] Plan of care initiated [] Hold pending MD visit [] Discharge    Electronically signed by: Tato De La Cruz PT, DPT    Note: If patient does not return for scheduled/recommended follow up visits, this note will serve as a discharge from care along with the most recent update on progress.

## 2021-04-28 ENCOUNTER — OFFICE VISIT (OUTPATIENT)
Dept: ORTHOPEDIC SURGERY | Age: 46
End: 2021-04-28
Payer: COMMERCIAL

## 2021-04-28 VITALS — WEIGHT: 273.59 LBS | HEIGHT: 68 IN | BODY MASS INDEX: 41.46 KG/M2

## 2021-04-28 DIAGNOSIS — M25.511 RIGHT SHOULDER PAIN, UNSPECIFIED CHRONICITY: Primary | ICD-10-CM

## 2021-04-28 DIAGNOSIS — M75.21 BICEPS TENDINITIS OF RIGHT UPPER EXTREMITY: ICD-10-CM

## 2021-04-28 DIAGNOSIS — M67.911 DYSFUNCTION OF RIGHT ROTATOR CUFF: ICD-10-CM

## 2021-04-28 DIAGNOSIS — M75.81 TENDINITIS OF RIGHT ROTATOR CUFF: ICD-10-CM

## 2021-04-28 PROCEDURE — 20610 DRAIN/INJ JOINT/BURSA W/O US: CPT | Performed by: ORTHOPAEDIC SURGERY

## 2021-04-28 PROCEDURE — 99203 OFFICE O/P NEW LOW 30 MIN: CPT | Performed by: ORTHOPAEDIC SURGERY

## 2021-04-28 NOTE — PROGRESS NOTES
[facility]  History and Physical  Shoulder Pain    Date:  2021    Name:  Jovanny Correa  Address:   [address]    :  1975      Age:   40 y.o.    SSN:  [id number]      Medical Record Number:  <[id number]>    Reason for Visit:    New Patient (right shoulder )      HPI:   Jovanny Correa is a 40 y.o. female who presents to our office today complaining of  right shoulder pain. She has been experiencing right, anterior shoulder pain that has been on going for a few months. She does not recall any specific injuries. She reports her PCP gave her Mobic and started her physical therapy over a month ago. It is bothering her at night. She does not feel that she has improved with therapy or the Mobic. Severe pain extend primarily over the anterior right shoulder. She denies any posterior shoulder pain. She denies any numbness or tingling down the arm. Pain Assessment  Location of Pain: Shoulder  Location Modifiers: Right  Severity of Pain: 3  Quality of Pain: Dull, Aching  Duration of Pain: Persistent  Frequency of Pain: Intermittent  Aggravating Factors: (heavy lifting)  Limiting Behavior: Yes  Relieving Factors: Rest, Nsaids  Work-Related Injury: No  Are there other pain locations you wish to document?: No    Review of Systems:  A 14 point review of systems available in the scanned medical record as documented by the patient. The review is negative with the exception of those things mentioned in the History of Present Illness and Past Medical History. Past History:  Past Medical History:   Diagnosis Date    Acute bronchitis     BV (bacterial vaginosis) 10/20/2016    COVID-19 2020    Edema     Hirsutism     Obesity     Shingles 2011     History reviewed. No pertinent surgical history.   Current Outpatient Medications on File Prior to Visit   Medication Sig Dispense Refill    meloxicam (MOBIC) 15 MG tablet Take 1 tablet by mouth daily 30 tablet 3    lidocaine (LIDODERM) 5 % Place 1 patch onto the skin daily 12 hours on, 12 hours off. 30 patch 0    ibuprofen (ADVIL;MOTRIN) 200 MG tablet Take 400 mg by mouth every 6 hours as needed for Pain Took 800 mg at 1730 today 03/19/2021       No current facility-administered medications on file prior to visit.       Social History     Socioeconomic History    Marital status:      Spouse name: Not on file    Number of children: Not on file    Years of education: Not on file    Highest education level: Not on file   Occupational History    Not on file   Social Needs    Financial resource strain: Not hard at all   [place] insecurity     Worry: Never true     Inability: Never true   [employer] needs     Medical: No     Non-medical: No   Tobacco Use    Smoking status: Current Every Day Smoker     Packs/day: 0.75     Years: 22.00     Pack years: 16.50     Types: Cigarettes    Smokeless tobacco: Never Used   Substance and Sexual Activity    Alcohol use: Yes     Comment: social    Drug use: No    Sexual activity: Yes     Partners: Male   Lifestyle    Physical activity     Days per week: Not on file     Minutes per session: Not on file    Stress: Not on file   Relationships    Social connections     Talks on phone: Not on file     Gets together: Not on file     Attends Gnosticist service: Not on file     Active member of club or organization: Not on file     Attends meetings of clubs or organizations: Not on file     Relationship status: Not on file    Intimate partner violence     Fear of current or ex partner: Not on file     Emotionally abused: Not on file     Physically abused: Not on file     Forced sexual activity: Not on file   Other Topics Concern    Not on file   Social History Narrative    Not on file     Family History   Problem Relation Age of Onset    Osteoarthritis Mother     Hypertension Mother     Diabetes Mother     Ulcerative Colitis Mother  Colon Polyps Mother     Elevated Lipids Mother     Hypertension Father    Yolanda Estrada Elevated Lipids Father     Heart Attack Father 61    Deep Vein Thrombosis Brother         wtih PE; no trigger       Current Medications:    Current Outpatient Medications   Medication Sig Dispense Refill    meloxicam (MOBIC) 15 MG tablet Take 1 tablet by mouth daily 30 tablet 3    lidocaine (LIDODERM) 5 % Place 1 patch onto the skin daily 12 hours on, 12 hours off. 30 patch 0    ibuprofen (ADVIL;MOTRIN) 200 MG tablet Take 400 mg by mouth every 6 hours as needed for Pain Took 800 mg at 1730 today 03/19/2021       No current facility-administered medications for this visit. Allergies: Allergies   Allergen Reactions    Bactrim [Sulfamethoxazole-Trimethoprim] Other (See Comments)     Flu like symptoms       Physical Exam:  Vitals:     General: Gray Dickens is a healthy and well appearing 40 y.o. female who is sitting comfortably in our office in acute distress. General Exam:   Constitutional: Patient is adequately groomed with no evidence of malnutrition  DTRs: Deep tendon reflexes are intact  Mental Status: The patient is oriented to time, place and person. The patient's mood and affect are appropriate. Lymphatic: The lymphatic examination bilaterally reveals all areas to be without enlargement or induration. Vascular: Examination reveals no swelling or calf tenderness. Peripheral pulses are palpable and 2+. Neurological: The patient has good coordination. There is no weakness or sensory deficit. Neuro: alert. Oriented X 3  Eyes: Extra-ocular muscles intact  Mouth: Oral mucosa moist. No perioral lesions  Pulm: Respirations unlabored and regular. right Shoulder Exam:  Inspection:  No gross deformities, no signs of infection. Palpation:  She has maximum tenderness over the rotator cuff footprint and mild over the biceps. No tenderness over the [facility] joint and posterior joint line.      Active Range of Motion: Forward elevation of 120, abduction of 120, external rotation with elbow at the side 50, internal rotation to the back is T10    Passive Range of Motion: Passively forward elevation can be further increased to 150. Strength:  -5/5 external rotation with resistance, 5/5 internal rotation with resistance. 4/5 jobes    Special Tests:   No Khoi muscle deformity. Neurovascular: Sensation to light touch is intact, no motor deficits, palpable radial pulses 2+    Additional Examinations:    Examination of the contralateral extremity does not show any tenderness, deformity or injury. Range of motion is unremarkable. There is no gross instability. There are no rashes, ulcerations or lesions. Strength and tone are normal.  Laboratory:  No visits with results within 14 Day(s) from this visit. Latest known visit with results is:   Office Visit on 05/14/2019   Component Date Value    Gram Stain Result 05/14/2019 *                    Value:1+ WBC's (Polymorphonuclear)  1+ Gram positive cocci  1+ Budding yeast      WOUND/ABSCESS 05/14/2019                      Value:Mixed skin sadie,  Heavy growth  No further workup      Anaerobic Culture 05/14/2019 CANCELED       No results found for this or any previous visit (from the past 24 hour(s)). Radiographic:   x ray from March was reviewed. Self assessment questionnaires including ASES and Simple Shoulder Test were completed today. Assessment:  Alana Hurst is a 40 y.o. female with right shoulder pain related to rotator cuff tendonitis and biceps tendonitis. Impression:  Encounter Diagnoses   Name Primary?     Right shoulder pain, unspecified chronicity Yes    Dysfunction of right rotator cuff     Biceps tendinitis of right upper extremity     Tendinitis of right rotator cuff        Office Procedures:  Orders Placed This Encounter   Procedures    SC ARTHROCENTESIS ASPIR&/INJ MAJOR JT/BURSA W/O US     80 mg Depomedrol with 8 cc 1% Lidocaine in 10cc syringe with 25G (22G) needle       Plan:     After a shared discussion we recommend that she continue with conservative management for now. We do recommend a corticosteroid injection today to help with some of her discomfort as she has not responded well to therapy or the oral medications. She was agreeable to this. We will proceed to do the injection in the right shoulder subacromial and glenohumeral space. After discussing the risks and benefits of a corticosteroid injection, Colette Patel did state an understanding and gave verbal consent to proceed. After cleansing the injection site with Chlora-prep and using aseptic techniques,  2 CC of Depo Medrol 40mg/ml and 8 CC of 1% lidocaine were injected in the right subacromial and glenohumeral space. She  tolerated the procedure well with no immediate adverse sequelae after the injection. A bandage was placed over the injection site. Appropriate post injections instructions were given to the patient. 4/28/2021  3:46 PM      Criss Lombardi PA-C  Orthopaedic Sports Medicine Physician Assistant    During this examination, Criss SERRA PA-C, functioned as a scribe for Dr. Tereso Rutledge. This dictation was performed with a verbal recognition program (DRAGON) and it was checked for errors. It is possible that there are still dictated errors within this office note. If so, please bring any errors to my attention for an addendum. All efforts were made to ensure that this office note is accurate.  _________________  I, Dr. Tereso Rutledge, personally performed the services described in this documentation as described by Criss Lombardi PA-C in my presence, and it is both accurate and complete. Migel Puente MD, PhD  4/28/2021

## 2021-04-28 NOTE — LETTER
Physical Therapy Rehabilitation Referral    Patient Name:  Dianna Stone      YOB: 1975    Diagnosis:    1. Right shoulder pain, unspecified chronicity    2. Dysfunction of right rotator cuff    3. Right biceps and rotator cuff tendonitis  Precautions:     [x] Evaluate and Treat    Post Op Instructions:  [] Continuous passive motion (CPM) [] Elbow ROM  [x] Exercise in plane of scapula  []  Strengthening     [] Pulley and instruction   [x] Home exercise program (copy to patient)   [] Sling when arm at risk  [] Sling or brace at all times   [] AAROM: Forward elevation to  140            [] AAROM: External rotation  To  40    [] Isometric external rotator strengthening [] AAROM: internal rotation: up the back  [x] Isometric abductor strengthening  [] AAROM: Internal abduction   [] Isometric internal rotator strengthening [] AAROM: cross-body adduction             Stretching:     Strengthening:  [x] Four quadrant (FE, ER, IR, CBA)  [x] Rotator cuff (ER, IR, Abd)  [x] Forward Elevation    [] External Rotators     [x] External Rotation    [] Internal Rotators  [x] Internal Rotation: up/back   [] Abductors     [x] Internal Rotation: supine in abduction  [x] Sleeper Stretch    [] Flexors  [x] Cross-body abduction    [] Extensors  [x] Pendulum (FE, Abd/Add, cw/ccw)  [x] Scapular Stabilizers   [x] Wall-walking (FE, Abd)        [x] Shoulder shrugs     [x] Table slides (FE)                [x] Rhomboid pinch  [] Elbow (flex, ext, pron, sup)        [] Lat.  Pull downs     [] Medial epicondylitis program       [] Forward punch   [] Lateral epicondylitis program       [] Internal rotators     [x] Progressive resistive exercises  [] Bench Press        [] Bench press plus  Activities:     [] Lateral pull-downs  [x] Rowing     [x] Progressive two-hand supine press  [] Stepper/Exercise bike   [x] Biceps: curls/supination  [] Swimming  [] Water exercises    Modalities:     Return to Sport:  [x] Of Choice      [] Plyometrics  [] Ultrasound     [] Rhythmic stabilization  [] Iontophoresis    [] Core strengthening   [] Moist heat     [] Sports specific program:   [] Massage         [x] Cryotherapy      [] Electrical stimulation     [] Paraffin  [] Whirlpool  [] TENS    [x] Home exercise program (copy to patient). Perform exercises for:   15     minutes    3      times/day  [x] Supervised physical therapy  Frequency: []  1x week  [x] 2x week  [] 3x week  [] Other:   Duration: [] 2 weeks   [] 4 weeks  [x] 6 weeks  [] Other:     Additional Instructions:     Migel Miller MD, PhD

## 2021-04-29 ENCOUNTER — OFFICE VISIT (OUTPATIENT)
Dept: FAMILY MEDICINE CLINIC | Age: 46
End: 2021-04-29
Payer: COMMERCIAL

## 2021-04-29 VITALS
OXYGEN SATURATION: 99 % | WEIGHT: 269 LBS | RESPIRATION RATE: 17 BRPM | HEART RATE: 76 BPM | TEMPERATURE: 97.5 F | SYSTOLIC BLOOD PRESSURE: 136 MMHG | BODY MASS INDEX: 41.48 KG/M2 | DIASTOLIC BLOOD PRESSURE: 84 MMHG

## 2021-04-29 DIAGNOSIS — R73.09 ELEVATED GLUCOSE: ICD-10-CM

## 2021-04-29 DIAGNOSIS — R10.13 EPIGASTRIC PAIN: ICD-10-CM

## 2021-04-29 DIAGNOSIS — R19.7 DIARRHEA, UNSPECIFIED TYPE: Primary | ICD-10-CM

## 2021-04-29 DIAGNOSIS — R10.33 PERIUMBILICAL PAIN: ICD-10-CM

## 2021-04-29 LAB
BILIRUBIN, POC: NEGATIVE
BLOOD URINE, POC: NORMAL
CLARITY, POC: CLEAR
COLOR, POC: YELLOW
GLUCOSE URINE, POC: NEGATIVE
KETONES, POC: NEGATIVE
LEUKOCYTE EST, POC: NEGATIVE
NITRITE, POC: NEGATIVE
PH, POC: 6
PROTEIN, POC: NEGATIVE
SPECIFIC GRAVITY, POC: 1.03
UROBILINOGEN, POC: 0.2

## 2021-04-29 PROCEDURE — 99214 OFFICE O/P EST MOD 30 MIN: CPT | Performed by: FAMILY MEDICINE

## 2021-04-29 PROCEDURE — 81002 URINALYSIS NONAUTO W/O SCOPE: CPT | Performed by: FAMILY MEDICINE

## 2021-04-29 NOTE — PROGRESS NOTES
Patient is here for diarrhea X 2 weeks and abdominal pain X 1 week - suprapubic area. She denies abnormal vaginal bleeding, discharge itching, odor,  or unusual pelvic pain, no dysuria, frequency or hematuria. No fever. No fever reducers taken. She works at [employer] in [facility]. Some heartburn - takes TUMS almost daily . Some nausea, but no emesis. Tried nap- Naturally Attached Parents and Peptobismal and not helping. Rectum is irritated . Using Preparation H wipes. Last bowel movement was earlier today . 5 bowel movements today . No rectal bleeding. 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 Her pain is 6/10 .  1/2 coffee/ day . No alcohol the past couple of week. Ate pizza last nigh for dinner. 2 pieces of peanut butter toast, peach yogurt. LMP 4/2/21    Review of Systems    ROS: All other systems were reviewed and are negative . Patient's allergies and medications were reviewed. Patient's past medical, surgical, social , and family history were reviewed. OBJECTIVE:  /84   Pulse 76   Temp 97.5 °F (36.4 °C) (Temporal)   Resp 17   Wt 269 lb (122 kg)   LMP 04/05/2021 (Approximate)   SpO2 99%   BMI 41.48 kg/m²     Physical Exam    General: NAD, cooperative, alert and oriented X 3. Mood / affect is good. good insight. well hydrated. Neck : no lymphadenopathy, supple, FROM  CV: Regular rate and rhythm , no murmurs/ rub/ gallop. No edema. Lungs : CTA bilaterally, breathing comfortably  Abdomen: positive bowel sounds, soft , epigastric < periumbilical tenderness. No rebound/ guarding, non distended. No hepatosplenomegaly. No CVA tenderness. Skin: no rashes. Non tender. ASSESSMENT/  PLAN:  1. Diarrhea, unspecified type  - Push fluids - water. Kayli Carpio / nap- Naturally Attached Parents. - Clostridium Difficile Toxin/Antigen; Future  - O&P PANEL ([facility] [employer]) #1; Future  - Fecal leukocytes; Future  - Gastrointestinal Panel, Molecular;  Future  - Pancreatic elastase, fecal; Future  - POCT Urinalysis no Micro 2. Epigastric pain  - Prilosec 20 mg qd X 2 -4 weeks. Recommended dietary/ lifestyle modifications. - Comprehensive Metabolic Panel; Future  - Amylase; Future  - CBC Auto Differential; Future  - Lipase; Future  - H. Pylori Breath Test, Adult; Future    3. Periumbilical pain  - Push fluids - water. BRAT/ Fletcher diet. - Comprehensive Metabolic Panel; Future  - Amylase; Future  - CBC Auto Differential; Future  - Lipase; Future  - H. Pylori Breath Test, Adult; Future  - POCT Urinalysis no Micro    4. Elevated glucose  - Hemoglobin A1C; Future     F/u if no improvement 5-7d/ prn increased symptoms.

## 2021-04-29 NOTE — PATIENT INSTRUCTIONS
Patient Education        Gastroesophageal Reflux Disease (GERD): Care Instructions  Overview     Gastroesophageal reflux disease (GERD) is the backward flow of stomach acid into the esophagus. The esophagus is the tube that leads from your throat to your stomach. A one-way valve prevents the stomach acid from backing up into this tube. But when you have GERD, this valve does not close tightly enough. This can also cause pain and swelling in your esophagus. (This is called esophagitis.)  If you have mild GERD symptoms including heartburn, you may be able to control the problem with antacids or over-the-counter medicine. You can also make lifestyle changes to help reduce your symptoms. These include changing your diet and eating habits, such as not eating late at night and losing weight. Follow-up care is a key part of your treatment and safety. Be sure to make and go to all appointments, and call your doctor if you are having problems. It's also a good idea to know your test results and keep a list of the medicines you take. How can you care for yourself at home? · Take your medicines exactly as prescribed. Call your doctor if you think you are having a problem with your medicine. · Your doctor may recommend over-the-counter medicine. For mild or occasional indigestion, antacids, such as Tums, Gaviscon, Mylanta, or Maalox, may help. Your doctor also may recommend over-the-counter acid reducers, such as famotidine (Pepcid AC), cimetidine (Tagamet HB), or omeprazole (Prilosec). Read and follow all instructions on the label. If you use these medicines often, talk with your doctor. · Change your eating habits. ? It's best to eat several small meals instead of two or three large meals. ? After you eat, wait 2 to 3 hours before you lie down. ? Chocolate, mint, and alcohol can make GERD worse. ?  Spicy foods, foods that have a lot of acid (like tomatoes and oranges), and coffee can make GERD symptoms worse in some people. If your symptoms are worse after you eat a certain food, you may want to stop eating that food to see if your symptoms get better. · Do not smoke or chew tobacco. Smoking can make GERD worse. If you need help quitting, talk to your doctor about stop-smoking programs and medicines. These can increase your chances of quitting for good. · If you have GERD symptoms at night, raise the head of your bed 6 to 8 inches by putting the frame on blocks or placing a foam wedge under the head of your mattress. (Adding extra pillows does not work.)  · Do not wear tight clothing around your middle. · Lose weight if you need to. Losing just 5 to 10 pounds can help. When should you call for help? Call your doctor now or seek immediate medical care if:    · You have new or different belly pain.     · Your stools are black and tarlike or have streaks of blood. Watch closely for changes in your health, and be sure to contact your doctor if:    · Your symptoms have not improved after 2 days.     · Food seems to catch in your throat or chest.   Where can you learn more? Go to https://[email or web address] org and sign in to your [employer] account. Enter I507 in the [employer] box to learn more about \"Gastroesophageal Reflux Disease (GERD): Care Instructions. \"     If you do not have an account, please click on the \"Sign Up Now\" link. Current as of: April 15, 2020               Content Version: 12.8  © 8150-6252 [employer], [facility]. Care instructions adapted under license by [facility] ([facility]). If you have questions about a medical condition or this instruction, always ask your healthcare professional. [place] [address] any warranty or liability for your use of this information.

## 2021-05-01 ENCOUNTER — APPOINTMENT (OUTPATIENT)
Dept: CT IMAGING | Age: 46
End: 2021-05-01
Payer: COMMERCIAL

## 2021-05-01 ENCOUNTER — HOSPITAL ENCOUNTER (EMERGENCY)
Age: 46
Discharge: HOME | End: 2021-05-01
Attending: EMERGENCY MEDICINE
Payer: COMMERCIAL

## 2021-05-01 VITALS
SYSTOLIC BLOOD PRESSURE: 107 MMHG | OXYGEN SATURATION: 100 % | HEART RATE: 66 BPM | BODY MASS INDEX: 41.59 KG/M2 | HEIGHT: 67 IN | DIASTOLIC BLOOD PRESSURE: 76 MMHG | TEMPERATURE: 98.7 F | RESPIRATION RATE: 14 BRPM | WEIGHT: 265 LBS

## 2021-05-01 DIAGNOSIS — R11.2 NAUSEA VOMITING AND DIARRHEA: ICD-10-CM

## 2021-05-01 DIAGNOSIS — R19.7 NAUSEA VOMITING AND DIARRHEA: ICD-10-CM

## 2021-05-01 DIAGNOSIS — R10.84 GENERALIZED ABDOMINAL PAIN: Primary | ICD-10-CM

## 2021-05-01 LAB
BASE EXCESS VENOUS: -0.9 MMOL/L (ref -2–3)
CARBOXYHEMOGLOBIN: 2.8 % (ref 0–1.5)
HCG QUALITATIVE: NEGATIVE
HCO3 VENOUS: 23.4 MMOL/L (ref 24–28)
HEMOGLOBIN, VEN, REDUCED: 15.1 %
METHEMOGLOBIN VENOUS: 0.3 % (ref 0–1.5)
O2 SAT, VEN: 84 %
PCO2, VEN: 36.7 MMHG (ref 41–51)
PH VENOUS: 7.41 (ref 7.35–7.45)
PO2, VEN: 46.5 MMHG (ref 25–40)
TCO2 CALC VENOUS: 25 MMOL/L

## 2021-05-01 PROCEDURE — 6360000002 HC RX W HCPCS: Performed by: EMERGENCY MEDICINE

## 2021-05-01 PROCEDURE — 82803 BLOOD GASES ANY COMBINATION: CPT

## 2021-05-01 PROCEDURE — 74177 CT ABD & PELVIS W/CONTRAST: CPT

## 2021-05-01 PROCEDURE — 96375 TX/PRO/DX INJ NEW DRUG ADDON: CPT

## 2021-05-01 PROCEDURE — 96374 THER/PROPH/DIAG INJ IV PUSH: CPT

## 2021-05-01 PROCEDURE — 36415 COLL VENOUS BLD VENIPUNCTURE: CPT

## 2021-05-01 PROCEDURE — 84703 CHORIONIC GONADOTROPIN ASSAY: CPT

## 2021-05-01 PROCEDURE — 96376 TX/PRO/DX INJ SAME DRUG ADON: CPT

## 2021-05-01 PROCEDURE — 2580000003 HC RX 258: Performed by: EMERGENCY MEDICINE

## 2021-05-01 PROCEDURE — 6360000004 HC RX CONTRAST MEDICATION: Performed by: EMERGENCY MEDICINE

## 2021-05-01 PROCEDURE — 99285 EMERGENCY DEPT VISIT HI MDM: CPT

## 2021-05-01 RX ORDER — 0.9 % SODIUM CHLORIDE 0.9 %
1000 INTRAVENOUS SOLUTION INTRAVENOUS ONCE
Status: COMPLETED | OUTPATIENT
Start: 2021-05-01 | End: 2021-05-01

## 2021-05-01 RX ORDER — MORPHINE SULFATE 4 MG/ML
4 INJECTION, SOLUTION INTRAMUSCULAR; INTRAVENOUS EVERY 30 MIN PRN
Status: DISCONTINUED | OUTPATIENT
Start: 2021-05-01 | End: 2021-05-01 | Stop reason: HOSPADM

## 2021-05-01 RX ORDER — ONDANSETRON 2 MG/ML
4 INJECTION INTRAMUSCULAR; INTRAVENOUS ONCE
Status: COMPLETED | OUTPATIENT
Start: 2021-05-01 | End: 2021-05-01

## 2021-05-01 RX ORDER — DICYCLOMINE HYDROCHLORIDE 10 MG/1
20 CAPSULE ORAL EVERY 6 HOURS PRN
Qty: 120 CAPSULE | Refills: 0 | Status: SHIPPED | OUTPATIENT
Start: 2021-05-01

## 2021-05-01 RX ORDER — DICYCLOMINE HYDROCHLORIDE 10 MG/1
10 CAPSULE ORAL
COMMUNITY
End: 2021-05-01 | Stop reason: SDUPTHER

## 2021-05-01 RX ADMIN — ONDANSETRON 4 MG: 2 INJECTION INTRAMUSCULAR; INTRAVENOUS at 01:11

## 2021-05-01 RX ADMIN — ONDANSETRON 4 MG: 2 INJECTION INTRAMUSCULAR; INTRAVENOUS at 04:04

## 2021-05-01 RX ADMIN — IOPAMIDOL 80 ML: 755 INJECTION, SOLUTION INTRAVENOUS at 01:49

## 2021-05-01 RX ADMIN — MORPHINE SULFATE 4 MG: 4 INJECTION INTRAVENOUS at 01:12

## 2021-05-01 RX ADMIN — SODIUM CHLORIDE 1000 ML: 9 INJECTION, SOLUTION INTRAVENOUS at 01:09

## 2021-05-01 ASSESSMENT — ENCOUNTER SYMPTOMS
NAUSEA: 1
VOMITING: 1
ABDOMINAL PAIN: 1
COUGH: 0
SHORTNESS OF BREATH: 0
DIARRHEA: 1

## 2021-05-01 ASSESSMENT — PAIN DESCRIPTION - DESCRIPTORS: DESCRIPTORS: CRAMPING;SHARP

## 2021-05-01 ASSESSMENT — PAIN DESCRIPTION - ONSET: ONSET: ON-GOING

## 2021-05-01 ASSESSMENT — PAIN DESCRIPTION - FREQUENCY: FREQUENCY: CONTINUOUS

## 2021-05-01 NOTE — ED PROVIDER NOTES
[address] [facility]          ATTENDING PHYSICIAN NOTE       Date of evaluation: 5/1/2021    Chief Complaint     Abdominal Pain      History of Present Illness     Jaymie Cano is a 40 y.o. female who presents with complaints of worsening abdominal pain over the last week located in the epigastric and periumbilical area, similar to her prior episode of pancreatitis that she had approximately 5 years ago, for which a cause was never really identified. The patient does not drink alcohol. She still has her gallbladder. Her symptoms initially began with watery diarrhea which has been going on for the last 2 weeks and last week she developed the abdominal pain and now has nausea and vomiting with an episode of emesis just prior to arrival.  She denies any urinary symptoms. The patient saw her doctor for the same symptoms and had outpatient labs done earlier today which all appear in the computer and are all normal including CBC, liver, renal, and lipase. The patient had tried one of her mother's Bentyl's without relief prior to coming. Review of Systems     Review of Systems   Constitutional: Negative for chills and fever. Respiratory: Negative for cough and shortness of breath. Cardiovascular: Negative for chest pain. Gastrointestinal: Positive for abdominal pain, diarrhea, nausea and vomiting. Genitourinary: Negative for difficulty urinating and dysuria. All other systems reviewed and are negative. Past Medical, Surgical, Family, and Social History     She has a past medical history of Acute bronchitis, BV (bacterial vaginosis), COVID-19, Edema, Hirsutism, Obesity, and Shingles. She has no past surgical history on file.   Her family history includes Colon Polyps in her mother; Deep Vein Thrombosis in her brother; Diabetes in her mother; Elevated Lipids in her father and mother; Heart Attack (age of onset: 61) in her father; Hypertension in her father and mother; Osteoarthritis in her mother; Ulcerative Colitis in her mother. She reports that she has been smoking cigarettes. She has a 16.50 pack-year smoking history. She has never used smokeless tobacco. She reports current alcohol use. She reports that she does not use drugs. Medications     Current Discharge Medication List      CONTINUE these medications which have NOT CHANGED    Details   meloxicam (MOBIC) 15 MG tablet Take 1 tablet by mouth daily  Qty: 30 tablet, Refills: 3    Associated Diagnoses: Rotator cuff tendonitis, right; Lateral epicondylitis of right elbow      lidocaine (LIDODERM) 5 % Place 1 patch onto the skin daily 12 hours on, 12 hours off. Qty: 30 patch, Refills: 0      ibuprofen (ADVIL;MOTRIN) 200 MG tablet Take 400 mg by mouth every 6 hours as needed for Pain Took 800 mg at 1730 today 03/19/2021             Allergies     She is allergic to bactrim [sulfamethoxazole-trimethoprim]. Physical Exam     INITIAL VITALS: BP: (!) 142/97, Temp: 98.7 °F (37.1 °C), Pulse: 51, Resp: 18, SpO2: 99 %   Physical Exam  Vitals signs and nursing note reviewed. Constitutional:       General: She is not in acute distress. Appearance: She is well-developed. She is not diaphoretic. Neck:      Musculoskeletal: Normal range of motion. Cardiovascular:      Rate and Rhythm: Normal rate and regular rhythm. Pulmonary:      Effort: Pulmonary effort is normal.      Breath sounds: Normal breath sounds. Abdominal:      General: Bowel sounds are decreased. There is no distension. Palpations: Abdomen is soft. Tenderness: There is abdominal tenderness in the epigastric area and periumbilical area. There is no guarding. Musculoskeletal: Normal range of motion. Skin:     General: Skin is warm and dry. Neurological:      Mental Status: She is alert and oriented to person, place, and time.    Psychiatric:         Behavior: Behavior normal.         Diagnostic Results     RADIOLOGY:  CT ABDOMEN PELVIS W IV CONTRAST Additional Contrast? None   Final Result   The appendix is not seen on the current examination, but no inflammatory    stranding identified in the right lower quadrant. No obstruction. No    clear signs of an acute process identified. LABS:   Results for orders placed or performed during the hospital encounter of 05/01/21   Blood Gas, Venous   Result Value Ref Range    pH, Xander 7.412 7.350 - 7.450    pCO2, Xander 36.7 (L) 41.0 - 51.0 mmHg    pO2, Xander 46.5 (H) 25 - 40 mmHg    HCO3, Venous 23.4 (L) 24.0 - 28.0 mmol/L    Base Excess, Xander -0.9 -2.0 - 3.0 mmol/L    O2 Sat, Xander 84 Not established %    Carboxyhemoglobin 2.8 (H) 0.0 - 1.5 %    MetHgb, Xander 0.3 0.0 - 1.5 %    TC02 (Calc), Xander 25 mmol/L    Hemoglobin, Xander, Reduced 15.10 %   HCG Qualitative, Serum   Result Value Ref Range    hCG Qual Negative Detects HCG level >10 MIU/mL       RECENT VITALS:  BP: 111/73,Temp: 98.7 °F (37.1 °C), Pulse: 51, Resp: 18, SpO2: 97 %       ED Course     Nursing Notes, Past Medical Hx, Past Surgical Hx, Social Hx,Allergies, and Family Hx were reviewed. patient was given the following medications:  Orders Placed This Encounter   Medications    0.9 % sodium chloride bolus    morphine injection 4 mg    ondansetron (ZOFRAN) injection 4 mg    iopamidol (ISOVUE-370) 76 % injection 80 mL    dicyclomine (BENTYL) 10 MG capsule     Sig: Take 2 capsules by mouth every 6 hours as needed (abdominal cramping)     Dispense:  120 capsule     Refill:  0    ondansetron (ZOFRAN) injection 4 mg       CONSULTS:  None    MEDICAL DECISIONMAKING / ASSESSMENT / Pierodelilah Nicolas is a 40 y.o. female presenting with nausea, vomiting, and diarrhea, the last symptom of which has been going on for 2 weeks.   She now has some periumbilical abdominal pain and since labs were unremarkable and she is continuing to have symptoms, CT scan was performed on arrival here and this is not showing any identifiable cause for her pain at

## 2021-05-01 NOTE — ED NOTES
Pt discharged to home. IV removed, tip intact and pressure applied. Pt given discharge instructions and verbalized understanding. Pt ambulatory at discharge and left without incident.       Mae Landis RN  05/01/21 4219

## 2021-05-02 PROBLEM — R73.03 PREDIABETES: Status: ACTIVE | Noted: 2021-01-01

## 2021-05-03 ENCOUNTER — APPOINTMENT (OUTPATIENT)
Dept: CT IMAGING | Age: 46
End: 2021-05-03
Payer: COMMERCIAL

## 2021-05-03 ENCOUNTER — HOSPITAL ENCOUNTER (EMERGENCY)
Age: 46
Discharge: HOME | End: 2021-05-03
Attending: EMERGENCY MEDICINE
Payer: COMMERCIAL

## 2021-05-03 VITALS
BODY MASS INDEX: 41.28 KG/M2 | HEIGHT: 67 IN | DIASTOLIC BLOOD PRESSURE: 85 MMHG | RESPIRATION RATE: 18 BRPM | TEMPERATURE: 97.8 F | OXYGEN SATURATION: 99 % | SYSTOLIC BLOOD PRESSURE: 135 MMHG | HEART RATE: 60 BPM | WEIGHT: 263 LBS

## 2021-05-03 DIAGNOSIS — R19.7 DIARRHEA, UNSPECIFIED TYPE: Primary | ICD-10-CM

## 2021-05-03 LAB
ALBUMIN SERPL-MCNC: 4 G/DL (ref 3.4–5)
ALP BLD-CCNC: 61 U/L (ref 40–129)
ALT SERPL-CCNC: 17 U/L (ref 10–40)
ANION GAP SERPL CALCULATED.3IONS-SCNC: 14 MMOL/L (ref 3–16)
AST SERPL-CCNC: 22 U/L (ref 15–37)
BASOPHILS ABSOLUTE: 0.1 K/UL (ref 0–0.2)
BASOPHILS RELATIVE PERCENT: 0.5 %
BILIRUB SERPL-MCNC: 0.4 MG/DL (ref 0–1)
BILIRUBIN DIRECT: <0.2 MG/DL (ref 0–0.3)
BILIRUBIN URINE: NEGATIVE
BILIRUBIN, INDIRECT: NORMAL MG/DL (ref 0–1)
BLOOD, URINE: ABNORMAL
BUN BLDV-MCNC: 14 MG/DL (ref 7–20)
CALCIUM SERPL-MCNC: 9.1 MG/DL (ref 8.3–10.6)
CHLORIDE BLD-SCNC: 101 MMOL/L (ref 99–110)
CLARITY: CLEAR
CO2: 19 MMOL/L (ref 21–32)
COLOR: YELLOW
CREAT SERPL-MCNC: 0.8 MG/DL (ref 0.6–1.1)
CRYSTALS, UA: ABNORMAL /HPF
EOSINOPHILS ABSOLUTE: 1.1 K/UL (ref 0–0.6)
EOSINOPHILS RELATIVE PERCENT: 9.3 %
GFR AFRICAN AMERICAN: >60
GFR NON-AFRICAN AMERICAN: >60
GLUCOSE BLD-MCNC: 104 MG/DL (ref 70–99)
GLUCOSE URINE: NEGATIVE MG/DL
HCG QUALITATIVE: NEGATIVE
HCT VFR BLD CALC: 45.3 % (ref 36–48)
HEMOGLOBIN: 15 G/DL (ref 12–16)
KETONES, URINE: NEGATIVE MG/DL
LEUKOCYTE ESTERASE, URINE: NEGATIVE
LIPASE: 32 U/L (ref 13–60)
LYMPHOCYTES ABSOLUTE: 2.8 K/UL (ref 1–5.1)
LYMPHOCYTES RELATIVE PERCENT: 23.8 %
MCH RBC QN AUTO: 31.3 PG (ref 26–34)
MCHC RBC AUTO-ENTMCNC: 33.1 G/DL (ref 31–36)
MCV RBC AUTO: 94.6 FL (ref 80–100)
MICROSCOPIC EXAMINATION: YES
MONOCYTES ABSOLUTE: 0.6 K/UL (ref 0–1.3)
MONOCYTES RELATIVE PERCENT: 5.1 %
NEUTROPHILS ABSOLUTE: 7.3 K/UL (ref 1.7–7.7)
NEUTROPHILS RELATIVE PERCENT: 61.3 %
NITRITE, URINE: NEGATIVE
PDW BLD-RTO: 12.9 % (ref 12.4–15.4)
PH UA: 5.5 (ref 5–8)
PLATELET # BLD: 214 K/UL (ref 135–450)
PMV BLD AUTO: 8.2 FL (ref 5–10.5)
POTASSIUM REFLEX MAGNESIUM: 5.2 MMOL/L (ref 3.5–5.1)
POTASSIUM SERPL-SCNC: 3.8 MMOL/L (ref 3.5–5.1)
PROTEIN UA: NEGATIVE MG/DL
RBC # BLD: 4.79 M/UL (ref 4–5.2)
RBC UA: ABNORMAL /HPF (ref 0–4)
SODIUM BLD-SCNC: 134 MMOL/L (ref 136–145)
SPECIFIC GRAVITY UA: >=1.03 (ref 1–1.03)
TOTAL PROTEIN: 7.8 G/DL (ref 6.4–8.2)
URINE TYPE: ABNORMAL
UROBILINOGEN, URINE: 0.2 E.U./DL
WBC # BLD: 11.9 K/UL (ref 4–11)
WBC UA: ABNORMAL /HPF (ref 0–5)

## 2021-05-03 PROCEDURE — 80076 HEPATIC FUNCTION PANEL: CPT

## 2021-05-03 PROCEDURE — 84703 CHORIONIC GONADOTROPIN ASSAY: CPT

## 2021-05-03 PROCEDURE — 99283 EMERGENCY DEPT VISIT LOW MDM: CPT

## 2021-05-03 PROCEDURE — 85025 COMPLETE CBC W/AUTO DIFF WBC: CPT

## 2021-05-03 PROCEDURE — 36415 COLL VENOUS BLD VENIPUNCTURE: CPT

## 2021-05-03 PROCEDURE — 81001 URINALYSIS AUTO W/SCOPE: CPT

## 2021-05-03 PROCEDURE — 83690 ASSAY OF LIPASE: CPT

## 2021-05-03 PROCEDURE — 2580000003 HC RX 258: Performed by: STUDENT IN AN ORGANIZED HEALTH CARE EDUCATION/TRAINING PROGRAM

## 2021-05-03 PROCEDURE — 84132 ASSAY OF SERUM POTASSIUM: CPT

## 2021-05-03 PROCEDURE — 80048 BASIC METABOLIC PNL TOTAL CA: CPT

## 2021-05-03 RX ORDER — SODIUM CHLORIDE, SODIUM LACTATE, POTASSIUM CHLORIDE, AND CALCIUM CHLORIDE .6; .31; .03; .02 G/100ML; G/100ML; G/100ML; G/100ML
1000 INJECTION, SOLUTION INTRAVENOUS ONCE
Status: COMPLETED | OUTPATIENT
Start: 2021-05-03 | End: 2021-05-03

## 2021-05-03 RX ORDER — CIPROFLOXACIN 500 MG/1
500 TABLET, FILM COATED ORAL 2 TIMES DAILY
Qty: 6 TABLET | Refills: 0 | Status: SHIPPED | OUTPATIENT
Start: 2021-05-03 | End: 2021-05-06

## 2021-05-03 RX ADMIN — SODIUM CHLORIDE, POTASSIUM CHLORIDE, SODIUM LACTATE AND CALCIUM CHLORIDE 1000 ML: 600; 310; 30; 20 INJECTION, SOLUTION INTRAVENOUS at 19:50

## 2021-05-03 ASSESSMENT — ENCOUNTER SYMPTOMS
BACK PAIN: 0
CHEST TIGHTNESS: 0
EYE REDNESS: 0
VOMITING: 0
EYE DISCHARGE: 0
SINUS PAIN: 0
EYE ITCHING: 0
PHOTOPHOBIA: 0
SORE THROAT: 0
ABDOMINAL PAIN: 1
NAUSEA: 1
EYE PAIN: 0
CONSTIPATION: 0
SHORTNESS OF BREATH: 0
DIARRHEA: 1
WHEEZING: 0
TROUBLE SWALLOWING: 0
COLOR CHANGE: 0

## 2021-05-03 ASSESSMENT — PAIN DESCRIPTION - LOCATION: LOCATION: BACK

## 2021-05-03 ASSESSMENT — PAIN DESCRIPTION - PAIN TYPE: TYPE: ACUTE PAIN

## 2021-05-03 ASSESSMENT — PAIN DESCRIPTION - DESCRIPTORS: DESCRIPTORS: SQUEEZING

## 2021-05-03 NOTE — ED PROVIDER NOTES
Lymphocytes % 23.8 %    Monocytes % 5.1 %    Eosinophils % 9.3 %    Basophils % 0.5 %    Neutrophils Absolute 7.3 1.7 - 7.7 K/uL    Lymphocytes Absolute 2.8 1.0 - 5.1 K/uL    Monocytes Absolute 0.6 0.0 - 1.3 K/uL    Eosinophils Absolute 1.1 (H) 0.0 - 0.6 K/uL    Basophils Absolute 0.1 0.0 - 0.2 K/uL   Basic Metabolic Panel w/ Reflex to MG   Result Value Ref Range    Sodium 134 (L) 136 - 145 mmol/L    Potassium reflex Magnesium 5.2 (H) 3.5 - 5.1 mmol/L    Chloride 101 99 - 110 mmol/L    CO2 19 (L) 21 - 32 mmol/L    Anion Gap 14 3 - 16    Glucose 104 (H) 70 - 99 mg/dL    BUN 14 7 - 20 mg/dL    CREATININE 0.8 0.6 - 1.1 mg/dL    GFR Non-African American >60 >60    GFR African American >60 >60    Calcium 9.1 8.3 - 10.6 mg/dL   Hepatic Function Panel   Result Value Ref Range    Total Protein 7.8 6.4 - 8.2 g/dL    Albumin 4.0 3.4 - 5.0 g/dL    Alkaline Phosphatase 61 40 - 129 U/L    ALT 17 10 - 40 U/L    AST 22 15 - 37 U/L    Total Bilirubin 0.4 0.0 - 1.0 mg/dL    Bilirubin, Direct <0.2 0.0 - 0.3 mg/dL    Bilirubin, Indirect see below 0.0 - 1.0 mg/dL   Lipase   Result Value Ref Range    Lipase 32.0 13.0 - 60.0 U/L   Urinalysis, reflex to microscopic   Result Value Ref Range    Color, UA Yellow Straw/Yellow    Clarity, UA Clear Clear    Glucose, Ur Negative Negative mg/dL    Bilirubin Urine Negative Negative    Ketones, Urine Negative Negative mg/dL    Specific Gravity, UA >=1.030 1.005 - 1.030    Blood, Urine MODERATE (A) Negative    pH, UA 5.5 5.0 - 8.0    Protein, UA Negative Negative mg/dL    Urobilinogen, Urine 0.2 <2.0 E.U./dL    Nitrite, Urine Negative Negative    Leukocyte Esterase, Urine Negative Negative    Microscopic Examination YES     Urine Type NotGiven    HCG Qualitative, Serum   Result Value Ref Range    hCG Qual Negative Detects HCG level >10 MIU/mL   Microscopic Urinalysis   Result Value Ref Range    WBC, UA None seen 0 - 5 /HPF    RBC, UA None seen 0 - 4 /HPF    Crystals, UA 3+ Ca.  Oxalate (A) None I called the radiologist on call today who reviewed the patient's scans from 2 days ago and did not see any evidence of nephrolithiasis or hydronephrosis. As result, I do not believe the patient requires repeat imaging today. As for her diarrhea, suspect this is likely viral versus bacterial infection. C. difficile negative as well as causes of hemorrhagic diarrhea. However, given the chronicity of the patient's symptoms over the last 10 days or so, I do believe the patient warrants empiric antibiotic therapy for persistent diarrhea. I have prescribed the patient with 500 mg of ciprofloxacin twice daily for 3 days. I recommended the patient follow-up with her primary care physician in the next 3 to 5 days for further evaluation management. Further, patient does have family history of ulcerative colitis. I do believe the patient would benefit from outpatient colonoscopy/work-up for possible IBS versus ulcerative colitis versus Crohn's disease or other cause of chronic diarrhea. This was discussed with the patient. At this time, the patient will be discharged with stable vital signs and robust return precautions. Ambulating without assistance. Pt provided with GI referral. CBC with leukocytosis. Otherwise normal lab findings. This patient was also evaluated by the attending physician. All care plans werediscussed and agreed upon. Clinical Impression     1.  Diarrhea, unspecified type        Disposition     PATIENT REFERRED TO:  Yfn Etienne MD  [facility] [address]  [address]  [phone]    Schedule an appointment as soon as possible for a visit in 3 days      [facility] Emergency Department  [address]  [address]  [phone]  Go to   If symptoms worsen, As needed    Clarence Keene MD  [address] [place] [address]  [phone]    Schedule an appointment as soon as possible for a visit in 1 week  possible colonoscopy / further work up of diarrhea      DISCHARGE MEDICATIONS:  New Prescriptions    CIPROFLOXACIN (CIPRO) 500 MG TABLET    Take 1 tablet by mouth 2 times daily for 3 days       DISPOSITION Decision To Discharge 05/03/2021 08:56:15 PM       Sumi Dooley MD  05/03/21 9748

## 2021-05-04 ENCOUNTER — APPOINTMENT (OUTPATIENT)
Dept: CT IMAGING | Age: 46
End: 2021-05-04
Payer: COMMERCIAL

## 2021-05-04 ENCOUNTER — HOSPITAL ENCOUNTER (EMERGENCY)
Age: 46
Discharge: HOME | End: 2021-05-04
Attending: EMERGENCY MEDICINE
Payer: COMMERCIAL

## 2021-05-04 VITALS
HEART RATE: 62 BPM | BODY MASS INDEX: 40.81 KG/M2 | TEMPERATURE: 98.7 F | DIASTOLIC BLOOD PRESSURE: 109 MMHG | HEIGHT: 67 IN | RESPIRATION RATE: 16 BRPM | SYSTOLIC BLOOD PRESSURE: 157 MMHG | OXYGEN SATURATION: 100 % | WEIGHT: 260 LBS

## 2021-05-04 DIAGNOSIS — R10.84 GENERALIZED ABDOMINAL PAIN: Primary | ICD-10-CM

## 2021-05-04 DIAGNOSIS — R19.7 DIARRHEA, UNSPECIFIED TYPE: ICD-10-CM

## 2021-05-04 LAB
A/G RATIO: 1.2 (ref 1.1–2.2)
ALBUMIN SERPL-MCNC: 3.8 G/DL (ref 3.4–5)
ALP BLD-CCNC: 61 U/L (ref 40–129)
ALT SERPL-CCNC: 14 U/L (ref 10–40)
ANION GAP SERPL CALCULATED.3IONS-SCNC: 14 MMOL/L (ref 3–16)
AST SERPL-CCNC: 12 U/L (ref 15–37)
BASOPHILS ABSOLUTE: 0 K/UL (ref 0–0.2)
BASOPHILS RELATIVE PERCENT: 0.3 %
BILIRUB SERPL-MCNC: 0.5 MG/DL (ref 0–1)
BUN BLDV-MCNC: 13 MG/DL (ref 7–20)
CALCIUM SERPL-MCNC: 9 MG/DL (ref 8.3–10.6)
CHLORIDE BLD-SCNC: 101 MMOL/L (ref 99–110)
CO2: 20 MMOL/L (ref 21–32)
CREAT SERPL-MCNC: 0.8 MG/DL (ref 0.6–1.1)
EOSINOPHILS ABSOLUTE: 1.2 K/UL (ref 0–0.6)
EOSINOPHILS RELATIVE PERCENT: 11.3 %
GFR AFRICAN AMERICAN: >60
GFR NON-AFRICAN AMERICAN: >60
GLOBULIN: 3.1 G/DL
GLUCOSE BLD-MCNC: 120 MG/DL (ref 70–99)
HCT VFR BLD CALC: 41.6 % (ref 36–48)
HEMOGLOBIN: 14.2 G/DL (ref 12–16)
LACTIC ACID: 1.6 MMOL/L (ref 0.4–2)
LIPASE: 24 U/L (ref 13–60)
LYMPHOCYTES ABSOLUTE: 2.1 K/UL (ref 1–5.1)
LYMPHOCYTES RELATIVE PERCENT: 19.3 %
MCH RBC QN AUTO: 31.8 PG (ref 26–34)
MCHC RBC AUTO-ENTMCNC: 34.2 G/DL (ref 31–36)
MCV RBC AUTO: 93 FL (ref 80–100)
MONOCYTES ABSOLUTE: 0.6 K/UL (ref 0–1.3)
MONOCYTES RELATIVE PERCENT: 5.4 %
NEUTROPHILS ABSOLUTE: 7 K/UL (ref 1.7–7.7)
NEUTROPHILS RELATIVE PERCENT: 63.7 %
PDW BLD-RTO: 12.4 % (ref 12.4–15.4)
PLATELET # BLD: 174 K/UL (ref 135–450)
PMV BLD AUTO: 7.8 FL (ref 5–10.5)
POTASSIUM REFLEX MAGNESIUM: 3.7 MMOL/L (ref 3.5–5.1)
RBC # BLD: 4.47 M/UL (ref 4–5.2)
REASON FOR REJECTION: NORMAL
REJECTED TEST: NORMAL
SODIUM BLD-SCNC: 135 MMOL/L (ref 136–145)
TOTAL PROTEIN: 6.9 G/DL (ref 6.4–8.2)
WBC # BLD: 11 K/UL (ref 4–11)

## 2021-05-04 PROCEDURE — 6360000002 HC RX W HCPCS: Performed by: EMERGENCY MEDICINE

## 2021-05-04 PROCEDURE — 96372 THER/PROPH/DIAG INJ SC/IM: CPT

## 2021-05-04 PROCEDURE — 82803 BLOOD GASES ANY COMBINATION: CPT

## 2021-05-04 PROCEDURE — 6370000000 HC RX 637 (ALT 250 FOR IP): Performed by: EMERGENCY MEDICINE

## 2021-05-04 PROCEDURE — 83690 ASSAY OF LIPASE: CPT

## 2021-05-04 PROCEDURE — 74177 CT ABD & PELVIS W/CONTRAST: CPT

## 2021-05-04 PROCEDURE — 83605 ASSAY OF LACTIC ACID: CPT

## 2021-05-04 PROCEDURE — 2580000003 HC RX 258: Performed by: EMERGENCY MEDICINE

## 2021-05-04 PROCEDURE — 99283 EMERGENCY DEPT VISIT LOW MDM: CPT

## 2021-05-04 PROCEDURE — 80053 COMPREHEN METABOLIC PANEL: CPT

## 2021-05-04 PROCEDURE — 85025 COMPLETE CBC W/AUTO DIFF WBC: CPT

## 2021-05-04 PROCEDURE — 6360000004 HC RX CONTRAST MEDICATION: Performed by: EMERGENCY MEDICINE

## 2021-05-04 RX ORDER — SODIUM CHLORIDE, SODIUM LACTATE, POTASSIUM CHLORIDE, AND CALCIUM CHLORIDE .6; .31; .03; .02 G/100ML; G/100ML; G/100ML; G/100ML
1000 INJECTION, SOLUTION INTRAVENOUS ONCE
Status: COMPLETED | OUTPATIENT
Start: 2021-05-04 | End: 2021-05-04

## 2021-05-04 RX ORDER — ONDANSETRON 4 MG/1
4 TABLET, ORALLY DISINTEGRATING ORAL ONCE
Status: COMPLETED | OUTPATIENT
Start: 2021-05-04 | End: 2021-05-04

## 2021-05-04 RX ORDER — OXYCODONE HYDROCHLORIDE AND ACETAMINOPHEN 5; 325 MG/1; MG/1
1 TABLET ORAL EVERY 4 HOURS PRN
Qty: 12 TABLET | Refills: 0 | Status: SHIPPED | OUTPATIENT
Start: 2021-05-04 | End: 2021-05-07 | Stop reason: SDUPTHER

## 2021-05-04 RX ADMIN — HYDROMORPHONE HYDROCHLORIDE 1 MG: 1 INJECTION, SOLUTION INTRAMUSCULAR; INTRAVENOUS; SUBCUTANEOUS at 19:49

## 2021-05-04 RX ADMIN — ONDANSETRON 4 MG: 4 TABLET, ORALLY DISINTEGRATING ORAL at 19:49

## 2021-05-04 RX ADMIN — IOPAMIDOL 80 ML: 755 INJECTION, SOLUTION INTRAVENOUS at 20:58

## 2021-05-04 RX ADMIN — SODIUM CHLORIDE, POTASSIUM CHLORIDE, SODIUM LACTATE AND CALCIUM CHLORIDE 1000 ML: 600; 310; 30; 20 INJECTION, SOLUTION INTRAVENOUS at 19:48

## 2021-05-04 ASSESSMENT — PAIN SCALES - GENERAL: PAINLEVEL_OUTOF10: 6

## 2021-05-04 ASSESSMENT — ENCOUNTER SYMPTOMS
VOMITING: 0
CONSTIPATION: 0
DIARRHEA: 1
ABDOMINAL PAIN: 1
EYES NEGATIVE: 1
NAUSEA: 1
COUGH: 0
SHORTNESS OF BREATH: 0

## 2021-05-04 NOTE — ED PROVIDER NOTES
ED Attending Attestation Note     Date of evaluation: 5/3/2021    This patient was seen by the resident. I have seen and examined the patient, agree with the workup, evaluation, management and diagnosis. The care plan has been discussed. I have reviewed the ECG and concur with the resident's interpretation. My assessment reveals a 78-year-old female who presents with 10-day history of abdominal cramping and watery diarrhea, patient reports approximately 10 episodes of watery stools over the last week and a half. Denied any blood in her stools. She is able to tolerate p.o. without any vomiting. Denied any fevers or chills. She was seen here several days ago for the same complaint. Labs were obtained which showed mild leukocytosis, no electrolyte abnormalities were seen. C. difficile was sent and this was negative. GI studies including Shigella, Shiga toxin, Campylobacter, and Salmonella were obtained and these were negative as well. Patient returns today for persistent abdominal cramping and diarrhea.   On my exam patient was diffusely tender to palpation in her abdomen, no rebound or guarding, appears well-hydrated     Annette Alonso MD  05/03/21 2014

## 2021-05-04 NOTE — ED PROVIDER NOTES
[address] [facility]          ATTENDING PHYSICIAN NOTE       Date of evaluation: 5/4/2021    Chief Complaint     Abdominal Pain      History of Present Illness     Juanpablo Turner is a 40 y.o. female who presents with severe abdominal pain. This is patient's third emergency medicine visit in the last 4 days. She developed abdominal pain and diarrhea over the last week. She was seen here on May 1 and then again on May 3 with negative work-ups including a CT of her abdomen. Also her primary care physician ordered multiple stool studies and these have been negative. Patient denies any blood in her stool. She states every day at around 4-6 PM she gets this severe abdominal cramping associated with the diarrhea. When patient arrived she was screaming in pain rolling around in the bed. Stated the pain is a level 10 out of 10. Crampy pain. She has had no vomiting. Review of Systems     Review of Systems   Constitutional: Negative for chills and fever. HENT: Negative. Eyes: Negative. Respiratory: Negative for cough and shortness of breath. Cardiovascular: Negative for chest pain and palpitations. Gastrointestinal: Positive for abdominal pain, diarrhea and nausea. Negative for constipation and vomiting. Genitourinary: Negative for dysuria, frequency, urgency, vaginal bleeding and vaginal discharge. Musculoskeletal: Negative. Skin: Negative. Neurological: Negative. Hematological: Negative for adenopathy. Psychiatric/Behavioral: Negative. All other systems reviewed and are negative. Past Medical, Surgical, Family, and Social History     She has a past medical history of Acute bronchitis, BV (bacterial vaginosis), COVID-19, Edema, Hirsutism, Obesity, Prediabetes, and Shingles. She has no past surgical history on file.   Her family history includes Colon Polyps in her mother; Deep Vein Thrombosis in her brother; Diabetes in her mother; Elevated Lipids in her father and mother; Heart Attack (age of onset: 61) in her father; Hypertension in her father and mother; Osteoarthritis in her mother; Ulcerative Colitis in her mother. She reports that she has been smoking cigarettes. She has a 16.50 pack-year smoking history. She has never used smokeless tobacco. She reports current alcohol use. She reports that she does not use drugs. Medications     Discharge Medication List as of 5/4/2021 10:05 PM      CONTINUE these medications which have NOT CHANGED    Details   ciprofloxacin (CIPRO) 500 MG tablet Take 1 tablet by mouth 2 times daily for 3 days, Disp-6 tablet, R-0Print      dicyclomine (BENTYL) 10 MG capsule Take 2 capsules by mouth every 6 hours as needed (abdominal cramping), Disp-120 capsule, R-0Normal      meloxicam (MOBIC) 15 MG tablet Take 1 tablet by mouth daily, Disp-30 tablet, R-3Normal      lidocaine (LIDODERM) 5 % Place 1 patch onto the skin daily 12 hours on, 12 hours off., Disp-30 patch, R-0Print      ibuprofen (ADVIL;MOTRIN) 200 MG tablet Take 400 mg by mouth every 6 hours as needed for Pain Took 800 mg at 1730 today 03/19/2021Historical Med             Allergies     She is allergic to bactrim [sulfamethoxazole-trimethoprim]. Physical Exam     INITIAL VITALS: BP: (!) 157/109, Temp: 98.7 °F (37.1 °C), Pulse: 62, Resp: 16, SpO2: 100 %   Physical Exam  Vitals signs and nursing note reviewed. Constitutional:       Appearance: She is obese. Comments: Patient rolling around the bed screaming in pain   HENT:      Mouth/Throat:      Mouth: Mucous membranes are moist.      Pharynx: Oropharynx is clear. Cardiovascular:      Rate and Rhythm: Normal rate and regular rhythm. Pulmonary:      Effort: Pulmonary effort is normal.      Breath sounds: Normal breath sounds. Abdominal:      General: There is no distension. Palpations: Abdomen is soft. Tenderness: There is no abdominal tenderness. Skin:     General: Skin is warm and dry. Neurological:      General: No focal deficit present. Mental Status: She is alert and oriented to person, place, and time. Psychiatric:         Mood and Affect: Mood is anxious. Diagnostic Results         RADIOLOGY:  CT ABDOMEN PELVIS W IV CONTRAST Additional Contrast? None   Final Result      Hepatic steatosis. Otherwise no evidence of acute intra-abdominal pathology identified.              LABS:   Results for orders placed or performed during the hospital encounter of 05/04/21   CBC auto differential   Result Value Ref Range    WBC 11.0 4.0 - 11.0 K/uL    RBC 4.47 4.00 - 5.20 M/uL    Hemoglobin 14.2 12.0 - 16.0 g/dL    Hematocrit 41.6 36.0 - 48.0 %    MCV 93.0 80.0 - 100.0 fL    MCH 31.8 26.0 - 34.0 pg    MCHC 34.2 31.0 - 36.0 g/dL    RDW 12.4 12.4 - 15.4 %    Platelets 644 497 - 625 K/uL    MPV 7.8 5.0 - 10.5 fL    Neutrophils % 63.7 %    Lymphocytes % 19.3 %    Monocytes % 5.4 %    Eosinophils % 11.3 %    Basophils % 0.3 %    Neutrophils Absolute 7.0 1.7 - 7.7 K/uL    Lymphocytes Absolute 2.1 1.0 - 5.1 K/uL    Monocytes Absolute 0.6 0.0 - 1.3 K/uL    Eosinophils Absolute 1.2 (H) 0.0 - 0.6 K/uL    Basophils Absolute 0.0 0.0 - 0.2 K/uL   Lactate, plasma   Result Value Ref Range    Lactic Acid 1.6 0.4 - 2.0 mmol/L   Comprehensive Metabolic Panel w/ Reflex to MG   Result Value Ref Range    Sodium 135 (L) 136 - 145 mmol/L    Potassium reflex Magnesium 3.7 3.5 - 5.1 mmol/L    Chloride 101 99 - 110 mmol/L    CO2 20 (L) 21 - 32 mmol/L    Anion Gap 14 3 - 16    Glucose 120 (H) 70 - 99 mg/dL    BUN 13 7 - 20 mg/dL    CREATININE 0.8 0.6 - 1.1 mg/dL    GFR Non-African American >60 >60    GFR African American >60 >60    Calcium 9.0 8.3 - 10.6 mg/dL    Total Protein 6.9 6.4 - 8.2 g/dL    Albumin 3.8 3.4 - 5.0 g/dL    Albumin/Globulin Ratio 1.2 1.1 - 2.2    Total Bilirubin 0.5 0.0 - 1.0 mg/dL    Alkaline Phosphatase 61 40 - 129 U/L    ALT 14 10 - 40 U/L    AST 12 (L) 15 - 37 U/L    Globulin 3.1 g/dL Lipase   Result Value Ref Range    Lipase 24.0 13.0 - 60.0 U/L   SPECIMEN REJECTION   Result Value Ref Range    Rejected Test VBG     Reason for Rejection see below            RECENT VITALS:  BP: (!) 157/109,Temp: 98.7 °F (37.1 °C), Pulse: 62, Resp: 16, SpO2: 100 %     Procedures         ED Course     Nursing Notes, Past Medical Hx, Past Surgical Hx, Social Hx,Allergies, and Family Hx were reviewed. patient was given the following medications:  Orders Placed This Encounter   Medications    HYDROmorphone (DILAUDID) injection 1 mg    ondansetron (ZOFRAN-ODT) disintegrating tablet 4 mg    lactated ringers bolus    iopamidol (ISOVUE-370) 76 % injection 80 mL    oxyCODONE-acetaminophen (PERCOCET) 5-325 MG per tablet     Sig: Take 1 tablet by mouth every 4 hours as needed for Pain for up to 3 days. Dispense:  12 tablet     Refill:  0       CONSULTS:  None    MEDICAL DECISIONMAKING / ASSESSMENT / Karo Reveles is a 40 y.o. female presents again with abdominal pain and diarrhea. Difficult to tell the etiology. . Patient had complete relief of her pain with 1 mg IM Dilaudid. Her CT abdomen labs were unremarkable. I will have her follow-up with our on-call GI DrVinod Lofton. Clinical Impression     1. Generalized abdominal pain    2. Diarrhea, unspecified type        Disposition     PATIENT REFERRED TO:  Mik Chung MD  [address] [place] [address]  [phone]    Schedule an appointment as soon as possible for a visit in 1 day        DISCHARGE MEDICATIONS:  Discharge Medication List as of 5/4/2021 10:05 PM      START taking these medications    Details   oxyCODONE-acetaminophen (PERCOCET) 5-325 MG per tablet Take 1 tablet by mouth every 4 hours as needed for Pain for up to 3 days. , Disp-12 tablet, R-0Print             DISPOSITION  -Discharged on percocet (12)       Matthew Perez MD  05/05/21 1720

## 2021-05-05 ENCOUNTER — TELEPHONE (OUTPATIENT)
Dept: FAMILY MEDICINE CLINIC | Age: 46
End: 2021-05-05

## 2021-05-05 NOTE — TELEPHONE ENCOUNTER
Previous message sent in lab results    Pt has appt Fri 5.07.2021    Sxs are persisting. Went to [facility] ER last night Monday 5.03.2021 for severe abdominal pain and diarrhea. Gave fluids and did blood work. Potassium low. Gave cipro BID x 3 days. Pt has diarrhea about 15 x a day and is hard to hold. Spoke with pt this morning 5.05.2021  Pt went to ER again last night for abd pain. They did ct and labs that came back normal.  Gave injection of dilaudid for the pain. Pt states she has severe pain at the same time every day around 430 -6  Eating BRAT diet mostly jello and applesauce. Pt states something is going on and doesn't understand the normal test results.     Please advise  Thank you

## 2021-05-07 ENCOUNTER — OFFICE VISIT (OUTPATIENT)
Dept: FAMILY MEDICINE CLINIC | Age: 46
End: 2021-05-07
Payer: COMMERCIAL

## 2021-05-07 VITALS
OXYGEN SATURATION: 98 % | RESPIRATION RATE: 14 BRPM | WEIGHT: 263 LBS | DIASTOLIC BLOOD PRESSURE: 82 MMHG | HEART RATE: 78 BPM | HEIGHT: 67 IN | BODY MASS INDEX: 41.28 KG/M2 | SYSTOLIC BLOOD PRESSURE: 128 MMHG | TEMPERATURE: 97.5 F

## 2021-05-07 DIAGNOSIS — R10.84 GENERALIZED ABDOMINAL PAIN: ICD-10-CM

## 2021-05-07 DIAGNOSIS — R19.7 DIARRHEA, UNSPECIFIED TYPE: Primary | ICD-10-CM

## 2021-05-07 PROCEDURE — 99214 OFFICE O/P EST MOD 30 MIN: CPT | Performed by: FAMILY MEDICINE

## 2021-05-07 RX ORDER — ONDANSETRON 4 MG/1
4 TABLET, FILM COATED ORAL 3 TIMES DAILY PRN
Qty: 30 TABLET | Refills: 1 | Status: SHIPPED | OUTPATIENT
Start: 2021-05-07

## 2021-05-07 RX ORDER — OXYCODONE HYDROCHLORIDE AND ACETAMINOPHEN 5; 325 MG/1; MG/1
1 TABLET ORAL EVERY 4 HOURS PRN
Qty: 12 TABLET | Refills: 0 | Status: SHIPPED | OUTPATIENT
Start: 2021-05-09 | End: 2021-05-12

## 2021-05-07 NOTE — PROGRESS NOTES
Subjective:      Patient ID: Son Vega 40 y.o. female. The encounter diagnosis was Diarrhea, unspecified type. 5/1    HPI    Colette Patel was seen by Dr. Niok Kim on 4/29 with diarrhea x 2 weeks. She was subsequently in the ER with diarrhea and severe abdominal pain on 5/1, 5/3 and 5/4. Stool was negative for bacterial pathogen and ova and parasite, c dif, WBC. Stool pancreatic elastase is normal.  H. Pylori breath test is negative. She had CT x 2 - negative. She was empirically treated with Cipro. - did not help at all. Has Bentyl - takes PRN . Helps a bit. Percocet takes the edge off the pain. Has taken 2 percocet per day for the past 3 days. She was referred to Dr Leslie Oswald. Colette Patel still has diarrhea 8-10  + times a day. No sharon or hematochezia. She has severe bloating. Severe abdominal pain in the evening in the periumbilical area - burning, cramping once radiated to back. Vomited only twice.  + nausea - Zofran helps. She is drinking plenty of fluids; is eating a very bland diet and very small meals. Has appt with [facility] on 5/17. She is taking a hair supplement - Nutrafol - x one month. She stopped it 3 days ago.  No improvement     Lab Results   Component Value Date     (L) 05/04/2021    K 3.7 05/04/2021     05/04/2021    CO2 20 (L) 05/04/2021    BUN 13 05/04/2021    CREATININE 0.8 05/04/2021    GLUCOSE 120 (H) 05/04/2021    CALCIUM 9.0 05/04/2021    PROT 6.9 05/04/2021    LABALBU 3.8 05/04/2021    BILITOT 0.5 05/04/2021    ALKPHOS 61 05/04/2021    AST 12 (L) 05/04/2021    ALT 14 05/04/2021    LABGLOM >60 05/04/2021    GFRAA >60 05/04/2021    AGRATIO 1.2 05/04/2021    GLOB 3.1 05/04/2021        TSH   Date Value Ref Range Status   02/13/2019 2.85 0.27 - 4.20 uIU/mL Final   12/28/2015 2.63 0.27 - 4.20 uIU/mL Final   10/31/2011 1.79 0.35 - 5.5 uIU/ml Final     Lab Results   Component Value Date    WBC 11.0 05/04/2021    HGB 14.2 05/04/2021    HCT 41.6 05/04/2021    MCV 93.0 bowel sounds. Aorta, femoral, DP and PT pulses intact. Assessment:       Diagnosis Orders   1. Diarrhea, unspecified type  Suspect post viral verses microscopic colitis  GI referral suspect   Start Bentyl 10 - 20 mg QID before meals routine. Can try Imodium. 2. Generalized abdominal pain  oxyCODONE-acetaminophen (PERCOCET) 5-325 MG per tablet  Controlled Substance Monitoring:    Acute and Chronic Pain Monitoring:   RX Monitoring 5/7/2021   Periodic Controlled Substance Monitoring Possible medication side effects, risk of tolerance/dependence & alternative treatments discussed. ;Assessed functional status. Plan:      Side effects of current medications reviewed and questions answered.    Continue to push

## 2021-05-07 NOTE — LETTER
[address] [facility]  [address]  Phone: [phone]  Fax: [phone]    Otto Saenz MD                                                                                   May 7, 2021                       [place] [address]      Dear Yumiko Pradhan: Thank you for enrolling in [address] Please follow the instructions below to securely access your online medical record. [employer] allows you to send messages to your doctor, view your test results, renew your prescriptions, schedule appointments, and more. How Do I Sign Up? 1. In your Internet browser, go to [email or web address]  2. Click on the Sign Up Now link in the Sign In box. You will see the New Member Sign Up page. 3. Enter your [employer] Access Code exactly as it appears below. You will not need to use this code after youve completed the sign-up process. If you do not sign up before the expiration date, you must request a new code. [employer] Access Code: [email or web address]  Expires: 6/17/2021  2:32 PM    4. Enter your Social Security Number ([id number]) and Date of Birth (mm/dd/yyyy) as indicated and click Submit. You will be taken to the next sign-up page. 5. Create a [employer] ID. This will be your [employer] login ID and cannot be changed, so think of one that is secure and easy to remember. 6. Create a [employer] password. You can change your password at any time. 7. Enter your Password Reset Question and Answer. This can be used at a later time if you forget your password. 8. Enter your e-mail address. You will receive e-mail notification when new information is available in [address] 9. Click Sign Up. You can now view your medical record. Additional Information  If you have questions, please contact the physician practice where you receive care. Remember, [employer] is NOT to be used for urgent needs. For medical emergencies, dial 911.     For questions regarding your [employer] account call [phone]. If you have a clinical question, please call your doctor's office.     Sincerely,  Cindy Craven MD

## 2021-05-08 ENCOUNTER — HOSPITAL ENCOUNTER (EMERGENCY)
Age: 46
Discharge: HOME | End: 2021-05-09
Attending: EMERGENCY MEDICINE
Payer: COMMERCIAL

## 2021-05-08 DIAGNOSIS — R11.2 NON-INTRACTABLE VOMITING WITH NAUSEA, UNSPECIFIED VOMITING TYPE: Primary | ICD-10-CM

## 2021-05-08 LAB
A/G RATIO: 1.3 (ref 1.1–2.2)
ALBUMIN SERPL-MCNC: 4 G/DL (ref 3.4–5)
ALP BLD-CCNC: 66 U/L (ref 40–129)
ALT SERPL-CCNC: 18 U/L (ref 10–40)
ANION GAP SERPL CALCULATED.3IONS-SCNC: 11 MMOL/L (ref 3–16)
AST SERPL-CCNC: 16 U/L (ref 15–37)
BASOPHILS ABSOLUTE: 0.1 K/UL (ref 0–0.2)
BASOPHILS RELATIVE PERCENT: 1.1 %
BILIRUB SERPL-MCNC: 0.3 MG/DL (ref 0–1)
BILIRUBIN URINE: NEGATIVE
BLOOD, URINE: ABNORMAL
BUN BLDV-MCNC: 13 MG/DL (ref 7–20)
CALCIUM SERPL-MCNC: 9.4 MG/DL (ref 8.3–10.6)
CHLORIDE BLD-SCNC: 98 MMOL/L (ref 99–110)
CLARITY: CLEAR
CO2: 22 MMOL/L (ref 21–32)
COLOR: YELLOW
CREAT SERPL-MCNC: 0.9 MG/DL (ref 0.6–1.1)
EOSINOPHILS ABSOLUTE: 0.9 K/UL (ref 0–0.6)
EOSINOPHILS RELATIVE PERCENT: 8.2 %
EPITHELIAL CELLS, UA: NORMAL /HPF (ref 0–5)
GFR AFRICAN AMERICAN: >60
GFR NON-AFRICAN AMERICAN: >60
GLOBULIN: 3.1 G/DL
GLUCOSE BLD-MCNC: 112 MG/DL (ref 70–99)
GLUCOSE URINE: NEGATIVE MG/DL
HCT VFR BLD CALC: 43.7 % (ref 36–48)
HEMOGLOBIN: 14.7 G/DL (ref 12–16)
KETONES, URINE: NEGATIVE MG/DL
LEUKOCYTE ESTERASE, URINE: NEGATIVE
LIPASE: 32 U/L (ref 13–60)
LYMPHOCYTES ABSOLUTE: 3.3 K/UL (ref 1–5.1)
LYMPHOCYTES RELATIVE PERCENT: 29.4 %
MCH RBC QN AUTO: 31.4 PG (ref 26–34)
MCHC RBC AUTO-ENTMCNC: 33.5 G/DL (ref 31–36)
MCV RBC AUTO: 93.5 FL (ref 80–100)
MICROSCOPIC EXAMINATION: YES
MONOCYTES ABSOLUTE: 0.7 K/UL (ref 0–1.3)
MONOCYTES RELATIVE PERCENT: 6.2 %
NEUTROPHILS ABSOLUTE: 6.2 K/UL (ref 1.7–7.7)
NEUTROPHILS RELATIVE PERCENT: 55.1 %
NITRITE, URINE: NEGATIVE
PDW BLD-RTO: 12.7 % (ref 12.4–15.4)
PH UA: 7 (ref 5–8)
PLATELET # BLD: 206 K/UL (ref 135–450)
PMV BLD AUTO: 8.2 FL (ref 5–10.5)
POTASSIUM REFLEX MAGNESIUM: 4 MMOL/L (ref 3.5–5.1)
PROTEIN UA: NEGATIVE MG/DL
RBC # BLD: 4.68 M/UL (ref 4–5.2)
RBC UA: NORMAL /HPF (ref 0–4)
SODIUM BLD-SCNC: 131 MMOL/L (ref 136–145)
SPECIFIC GRAVITY UA: 1.02 (ref 1–1.03)
TOTAL PROTEIN: 7.1 G/DL (ref 6.4–8.2)
URINE REFLEX TO CULTURE: ABNORMAL
URINE TYPE: ABNORMAL
UROBILINOGEN, URINE: 0.2 E.U./DL
WBC # BLD: 11.2 K/UL (ref 4–11)
WBC UA: NORMAL /HPF (ref 0–5)

## 2021-05-08 PROCEDURE — 85025 COMPLETE CBC W/AUTO DIFF WBC: CPT

## 2021-05-08 PROCEDURE — 99283 EMERGENCY DEPT VISIT LOW MDM: CPT

## 2021-05-08 PROCEDURE — 81001 URINALYSIS AUTO W/SCOPE: CPT

## 2021-05-08 PROCEDURE — 83690 ASSAY OF LIPASE: CPT

## 2021-05-08 PROCEDURE — 80053 COMPREHEN METABOLIC PANEL: CPT

## 2021-05-08 PROCEDURE — 96375 TX/PRO/DX INJ NEW DRUG ADDON: CPT

## 2021-05-08 PROCEDURE — 96374 THER/PROPH/DIAG INJ IV PUSH: CPT

## 2021-05-08 PROCEDURE — 6360000002 HC RX W HCPCS: Performed by: EMERGENCY MEDICINE

## 2021-05-08 RX ORDER — DROPERIDOL 2.5 MG/ML
1.25 INJECTION, SOLUTION INTRAMUSCULAR; INTRAVENOUS ONCE
Status: COMPLETED | OUTPATIENT
Start: 2021-05-08 | End: 2021-05-08

## 2021-05-08 RX ADMIN — HYDROMORPHONE HYDROCHLORIDE 1 MG: 1 INJECTION, SOLUTION INTRAMUSCULAR; INTRAVENOUS; SUBCUTANEOUS at 23:09

## 2021-05-08 RX ADMIN — DROPERIDOL 1.25 MG: 2.5 INJECTION, SOLUTION INTRAMUSCULAR; INTRAVENOUS at 23:09

## 2021-05-08 ASSESSMENT — PAIN DESCRIPTION - LOCATION: LOCATION: ABDOMEN

## 2021-05-08 ASSESSMENT — PAIN DESCRIPTION - PAIN TYPE: TYPE: ACUTE PAIN

## 2021-05-08 ASSESSMENT — PAIN DESCRIPTION - FREQUENCY: FREQUENCY: CONTINUOUS

## 2021-05-09 VITALS
HEIGHT: 67 IN | TEMPERATURE: 98.3 F | BODY MASS INDEX: 41.12 KG/M2 | HEART RATE: 88 BPM | SYSTOLIC BLOOD PRESSURE: 104 MMHG | RESPIRATION RATE: 14 BRPM | OXYGEN SATURATION: 93 % | DIASTOLIC BLOOD PRESSURE: 72 MMHG | WEIGHT: 262 LBS

## 2021-05-09 RX ORDER — ONDANSETRON 4 MG/1
4 TABLET, ORALLY DISINTEGRATING ORAL EVERY 8 HOURS PRN
Qty: 20 TABLET | Refills: 0 | Status: SHIPPED | OUTPATIENT
Start: 2021-05-09

## 2021-05-09 ASSESSMENT — ENCOUNTER SYMPTOMS
COUGH: 0
EYE PAIN: 0
DIARRHEA: 1
NAUSEA: 1
VOMITING: 1
SHORTNESS OF BREATH: 0
ABDOMINAL PAIN: 1
WHEEZING: 0

## 2021-05-09 NOTE — ED PROVIDER NOTES
[address] [facility]          ATTENDING PHYSICIAN NOTE       Date of evaluation: 5/8/2021    Chief Complaint     Abdominal Pain (x 3 weeks. Has been seen in ED three times in 10 days for same exact symptoms. Was seen at PCP yesterday. )      History of Present Illness     Cindy Ng is a 40 y.o. female who presents with chief complaint abdominal pain. Patient states that she has had abdominal pain constantly for the past 3 weeks. The pain is located periumbilical area. Is crampy in nature. Associate with nausea, vomiting and diarrhea. 10/10 in severity  Does seem be worse in the evenings and after she eats certain foods. Did have some pizza earlier today and she believes may have made her symptoms worse. No blood in her stool or vomit. Has been seen here multiple times for the same thing and has an appoint with Dr. William Hollis in a couple of weeks for endoscopy. Review of Systems     Review of Systems   Constitutional: Negative for chills and fever. HENT: Negative for congestion. Eyes: Negative for pain. Respiratory: Negative for cough, shortness of breath and wheezing. Cardiovascular: Negative for chest pain and leg swelling. Gastrointestinal: Positive for abdominal pain, diarrhea, nausea and vomiting. Genitourinary: Negative for dysuria. Musculoskeletal: Negative for arthralgias. Skin: Negative for rash. Neurological: Negative for weakness and headaches. All other systems reviewed and are negative. Past Medical, Surgical, Family, and Social History         Diagnosis Date    Acute bronchitis     BV (bacterial vaginosis) 10/20/2016    COVID-19 11/2020    Edema     Hirsutism     Obesity     Prediabetes 2021    Shingles 07/29/2011     No past surgical history on file.   Her family history includes Colon Polyps in her mother; Deep Vein Thrombosis in her brother; Diabetes in her mother; Elevated Lipids in her father and mother; Heart Attack to display       LABS:   Results for orders placed or performed during the hospital encounter of 05/08/21   CBC Auto Differential   Result Value Ref Range    WBC 11.2 (H) 4.0 - 11.0 K/uL    RBC 4.68 4.00 - 5.20 M/uL    Hemoglobin 14.7 12.0 - 16.0 g/dL    Hematocrit 43.7 36.0 - 48.0 %    MCV 93.5 80.0 - 100.0 fL    MCH 31.4 26.0 - 34.0 pg    MCHC 33.5 31.0 - 36.0 g/dL    RDW 12.7 12.4 - 15.4 %    Platelets 139 814 - 078 K/uL    MPV 8.2 5.0 - 10.5 fL    Neutrophils % 55.1 %    Lymphocytes % 29.4 %    Monocytes % 6.2 %    Eosinophils % 8.2 %    Basophils % 1.1 %    Neutrophils Absolute 6.2 1.7 - 7.7 K/uL    Lymphocytes Absolute 3.3 1.0 - 5.1 K/uL    Monocytes Absolute 0.7 0.0 - 1.3 K/uL    Eosinophils Absolute 0.9 (H) 0.0 - 0.6 K/uL    Basophils Absolute 0.1 0.0 - 0.2 K/uL   Comprehensive Metabolic Panel w/ Reflex to MG   Result Value Ref Range    Sodium 131 (L) 136 - 145 mmol/L    Potassium reflex Magnesium 4.0 3.5 - 5.1 mmol/L    Chloride 98 (L) 99 - 110 mmol/L    CO2 22 21 - 32 mmol/L    Anion Gap 11 3 - 16    Glucose 112 (H) 70 - 99 mg/dL    BUN 13 7 - 20 mg/dL    CREATININE 0.9 0.6 - 1.1 mg/dL    GFR Non-African American >60 >60    GFR African American >60 >60    Calcium 9.4 8.3 - 10.6 mg/dL    Total Protein 7.1 6.4 - 8.2 g/dL    Albumin 4.0 3.4 - 5.0 g/dL    Albumin/Globulin Ratio 1.3 1.1 - 2.2    Total Bilirubin 0.3 0.0 - 1.0 mg/dL    Alkaline Phosphatase 66 40 - 129 U/L    ALT 18 10 - 40 U/L    AST 16 15 - 37 U/L    Globulin 3.1 g/dL   Lipase   Result Value Ref Range    Lipase 32.0 13.0 - 60.0 U/L   Urinalysis Reflex to Culture    Specimen: Urine, clean catch   Result Value Ref Range    Color, UA Yellow Straw/Yellow    Clarity, UA Clear Clear    Glucose, Ur Negative Negative mg/dL    Bilirubin Urine Negative Negative    Ketones, Urine Negative Negative mg/dL    Specific Gravity, UA 1.020 1.005 - 1.030    Blood, Urine TRACE-INTACT (A) Negative    pH, UA 7.0 5.0 - 8.0    Protein, UA Negative Negative mg/dL Urobilinogen, Urine 0.2 <2.0 E.U./dL    Nitrite, Urine Negative Negative    Leukocyte Esterase, Urine Negative Negative    Microscopic Examination YES     Urine Type Voided     Urine Reflex to Culture Not Indicated    Microscopic Urinalysis   Result Value Ref Range    WBC, UA None seen 0 - 5 /HPF    RBC, UA None seen 0 - 4 /HPF    Epithelial Cells, UA 2-5 0 - 5 /HPF       RECENT VITALS:  BP: 104/72, Temp: 98.3 °F (36.8 °C), Pulse: 88, Resp: 14, SpO2: 93 %     Procedures         ED Course     Nursing Notes, Past Medical Hx, Past Surgical Hx, Social Hx, Allergies, and Family Hx were reviewed. The patient was given the following medications:  Orders Placed This Encounter   Medications    droperidol (INAPSINE) injection 1.25 mg    DISCONTD: HYDROmorphone (DILAUDID) injection 1 mg    ondansetron (ZOFRAN ODT) 4 MG disintegrating tablet     Sig: Take 1 tablet by mouth every 8 hours as needed for Nausea     Dispense:  20 tablet     Refill:  0       CONSULTS:  None    MEDICAL DECISION MAKING / ASSESSMENT / Adriel Rj is a 40 y.o. female who presents with 3 weeks of constant crampy abdominal pain. Associate with nausea and vomiting. Does not appear particularly dehydrated. Abdominal examination has mild diffuse tenderness without focality. No rebound or guarding. Not suggestive of acute abdomen at this time. Labs show mild leukocytosis which is baseline. Did recently have a CT scan which was unremarkable. Feeling much better here with droperidol and 1 mg of hydromorphone. Tolerating p.o. Comfortable to go home at this time to rehydrate orally and follow-up with her gastroenterologist.  Return precautions discussed and patient discharged home in good condition. Clinical Impression     1.  Non-intractable vomiting with nausea, unspecified vomiting type        Disposition     PATIENT REFERRED TO:  Wallace Jacinto MD  [facility]  [address] Carin Christie MD  [address] [place] [address]  [phone]            DISCHARGE MEDICATIONS:  Discharge Medication List as of 5/9/2021  2:35 AM      START taking these medications    Details   ondansetron (ZOFRAN ODT) 4 MG disintegrating tablet Take 1 tablet by mouth every 8 hours as needed for Nausea, Disp-20 tablet, R-0Normal             DISPOSITION Decision To Discharge 05/09/2021 02:21:56 AM       Rema Diego MD  05/09/21 2970

## 2021-05-09 NOTE — ED NOTES
Bed: A01-01  Expected date:   Expected time:   Means of arrival:   Comments:  [employer], RN  05/08/21 9887

## 2021-05-26 ENCOUNTER — OFFICE VISIT (OUTPATIENT)
Dept: ORTHOPEDIC SURGERY | Age: 46
End: 2021-05-26
Payer: COMMERCIAL

## 2021-05-26 VITALS — BODY MASS INDEX: 41.12 KG/M2 | WEIGHT: 262 LBS | HEIGHT: 67 IN

## 2021-05-26 DIAGNOSIS — M75.81 TENDINITIS OF RIGHT ROTATOR CUFF: ICD-10-CM

## 2021-05-26 DIAGNOSIS — M75.21 BICEPS TENDINITIS OF RIGHT UPPER EXTREMITY: Primary | ICD-10-CM

## 2021-05-26 PROCEDURE — 99213 OFFICE O/P EST LOW 20 MIN: CPT | Performed by: ORTHOPAEDIC SURGERY

## 2021-05-26 PROCEDURE — 20610 DRAIN/INJ JOINT/BURSA W/O US: CPT | Performed by: ORTHOPAEDIC SURGERY

## 2021-05-26 RX ORDER — PANTOPRAZOLE SODIUM 40 MG/1
TABLET, DELAYED RELEASE ORAL
COMMUNITY
Start: 2021-05-20

## 2021-05-26 NOTE — LETTER
Of Choice      [] Plyometrics  [] Ultrasound     [] Rhythmic stabilization  [] Iontophoresis    [] Core strengthening   [] Moist heat     [] Sports specific program:   [] Massage         [x] Cryotherapy      [] Electrical stimulation     [] Paraffin  [] Whirlpool  [] TENS    [x] Home exercise program (copy to patient). Perform exercises for:   15     minutes    3      times/day  [x] Supervised physical therapy  Frequency: [x]  1x week  [] 2x week  [] 3x week  [] Other:   Duration: [] 2 weeks   [] 4 weeks  [x] 6 weeks  [] Other:     Additional Instructions:     Migel Diaz MD, PhD

## 2021-05-26 NOTE — PROGRESS NOTES
[address] [facility]  History and Physical  Shoulder Pain    Date:  2021    Name:  Orly Trevino  Address:   [address]    :  1975      Age:   40 y.o.    SSN:  [id number]      Medical Record Number:  <[id number]>    Reason for Visit:    Follow-up (right shoulder )      HPI:   Orly Trevino is a 40 y.o. female who presents to our office today for follow up of the right shoulder pain. The patient was diagnosed with rotator cuff and biceps tendinitis. She was given a corticosteroid injection and presents today for follow-up. The patient reports that she is doing a little bit better than last visit but not completely pain free. The one movement that still bothers her is if she abducts her right shoulder while driving or if she does internal rotation in the back. She is still working full time and is able to transfer patients at work. She has done physical therapy and has used meloxicam which was initiated by her primary care physician. Pain Assessment  Location of Pain: Shoulder  Severity of Pain: 0  Quality of Pain: Aching  Duration of Pain: A few minutes  Frequency of Pain: Intermittent  Aggravating Factors: Other (Comment) (certain motions)  Relieving Factors: Rest  Result of Injury: No  Work-Related Injury: No  Are there other pain locations you wish to document?: No    Review of Systems    Done: 21  Patient has had no medical changes since last evaluated        Review of Systems:  A 14 point review of systems available in the scanned medical record as documented by the patient. The review is negative with the exception of those things mentioned in the History of Present Illness and Past Medical History. Past Medical History:  Patient's medications, allergies, past medical, surgical, social and family histories were reviewed and updated as appropriate. Allergies:   Allergies   Allergen Reactions    Bactrim [Sulfamethoxazole-Trimethoprim] Other (See Comments)     Flu like symptoms       Physical Exam:  Vitals:     General: Ivory Acuña is a healthy and well appearing 40 y.o. female who is sitting comfortably in our office in acute distress. Skin:  There are no skin lesions, cellulitis, or extreme edema. The patient has warm and well-perfused Bilateral upper extremities with brisk capillary refill. Eyes: Extra-ocular muscles intact  Mouth: Oral mucosa moist. No perioral lesions  Pulm: Respirations unlabored and regular. Neuro: Alert and oriented x3    right Shoulder Exam:  Inspection:  No gross deformities, no signs of infection. Palpation:  She has tenderness mainly over the biceps and mild over the rotator cuff footprint. Active Range of Motion: Forward elevation of 150, abduction of 160, external rotation with elbow at the side 45 b/l, internal rotation to the back is T8 vs T6    Passive Range of Motion:unchanges with active. Strength: External rotation with resistance with elbow at the side -5/5, internal rotation with resistance with elbow at the side 5/5, champagne toast +4/5    Special Tests: She has pain in the bicipital groove with cross body adduction, positive Haywood, no Khoi muscle deformity. Neurovascular: Sensation to light touch is intact, no motor deficits, palpable radial pulses 2+    Additional Examinations:    Examination of the contralateral extremity does not show any tenderness, deformity or injury. Range of motion is unremarkable. There is no gross instability. There are no rashes, ulcerations or lesions. Strength and tone are normal.      Radiographic:  X-rays not obtained today. Assessment:  Ivory Acuña is a 40 y.o. female with right shoulder pain related to biceps tendinitis primarily with some rotator cuff impingement. Differential diagnosis includes early frozen shoulder. Impression:  Encounter Diagnoses   Name Primary?     Biceps tendinitis of right upper extremity Yes    Tendinitis of right rotator cuff        Office Procedures:  Orders Placed This Encounter   Procedures    [facility]     Referral Priority:   Routine     Referral Type:   Eval and Treat     Referral Reason:   Specialty Services Required     Requested Specialty:   Physical Therapy     Number of Visits Requested:   1    [place] ARTHROCENTESIS ASPIR&/INJ MAJOR JT/BURSA W/O US     80 mg Depomedrol with 8 cc 1% Lidocaine in 10cc syringe with 25G (22G) needle       Plan:   We did have a lengthy discussion with the patient today. At this point she has had formal therapy and corticosteroid injections along with oral medications. She continues to have persistent symptoms. We do recommend obtaining an MRI to rule out any tears related to rotator cuff or biceps. Unfortunately due to financial reasons the patient did declined the MRI today. We feel that she may benefit with from a corticosteroid injection followed by 1-2 sessions of targeted physical therapy program.  She was agreeable to that. All her questions were fully answered today. After discussing the risks and benefits of a corticosteroid injection, [facility] did state an understanding and gave verbal consent to proceed. After cleansing the injection site with Chlora-prep and using aseptic techniques,  2 CC of Depo Medrol 40mg/ml and 8 CC of 1% lidocaine were injected in the right glenohumeral joint. She  tolerated the procedure well with no immediate adverse sequelae after the injection. A bandage was placed over the injection site. Appropriate post injections instructions were given to the patient. 5/26/2021  11:20 AM      Justin Wade PA-C  Orthopaedic Sports Medicine Physician Assistant    During this examination, Justin SERRA PA-C, functioned as a scribe for Dr. Alex Soto. This dictation was performed with a verbal recognition program (DRAGON) and it was checked for errors.   It is possible that there are still dictated errors within this office note. If so, please bring any errors to my attention for an addendum. All efforts were made to ensure that this office note is accurate.  _____________  I, Dr. Rory Weathers, personally performed the services described in this documentation as described by Valentina Tovar PA-C in my presence, and it is both accurate and complete. Migel Montague MD, PhD  5/26/2021

## 2023-06-10 ENCOUNTER — APPOINTMENT (OUTPATIENT)
Dept: GENERAL RADIOLOGY | Age: 48
End: 2023-06-10
Payer: COMMERCIAL

## 2023-06-10 ENCOUNTER — HOSPITAL ENCOUNTER (EMERGENCY)
Age: 48
Discharge: HOME | End: 2023-06-10
Payer: COMMERCIAL

## 2023-06-10 VITALS
TEMPERATURE: 98.3 F | HEART RATE: 76 BPM | RESPIRATION RATE: 16 BRPM | WEIGHT: 262 LBS | BODY MASS INDEX: 41.04 KG/M2 | DIASTOLIC BLOOD PRESSURE: 92 MMHG | OXYGEN SATURATION: 99 % | SYSTOLIC BLOOD PRESSURE: 151 MMHG

## 2023-06-10 DIAGNOSIS — M79.644 PAIN OF FINGER OF RIGHT HAND: Primary | ICD-10-CM

## 2023-06-10 PROCEDURE — 73140 X-RAY EXAM OF FINGER(S): CPT

## 2023-06-10 ASSESSMENT — PAIN DESCRIPTION - ORIENTATION: ORIENTATION: RIGHT

## 2023-06-10 ASSESSMENT — PAIN DESCRIPTION - LOCATION: LOCATION: OTHER (COMMENT)

## 2023-06-10 ASSESSMENT — PAIN SCALES - GENERAL: PAINLEVEL_OUTOF10: 5

## 2023-06-10 ASSESSMENT — PAIN - FUNCTIONAL ASSESSMENT: PAIN_FUNCTIONAL_ASSESSMENT: 0-10

## 2023-06-10 NOTE — ED PROVIDER NOTES
independently by LES. Medical Decision Making  Problems Addressed:  Pain of finger of right hand: acute illness or injury    Amount and/or Complexity of Data Reviewed  Radiology: ordered. Clinical Impression     1. Pain of finger of right hand      Disposition     PATIENT REFERRED TO:  MD Ubaldo Saravia [address]  [phone]          DISCHARGE MEDICATIONS:  New Prescriptions    No medications on file     DISPOSITION Decision To Discharge 06/10/2023 08:01:52 PM    Diagnostic Results and Other Data     RADIOLOGY:  XR FINGER RIGHT (MIN 2 VIEWS)   Final Result   Impression:   No acute fracture. Periosteal new bone formation surrounding the fifth proximal phalanx of unknown etiology. This may represent sequelae of prior injury. Differential includes arthritic etiology, metabolic disorder, or less likely infection or tumor. Recommend follow-up. LABS:   No results found for this visit on 06/10/23. ED BEDSIDE ULTRASOUND:  No results found. RECENT VITALS:  BP: (!) 163/107, Temp: 98.3 °F (36.8 °C), Pulse: 76, Respirations: 16, SpO2: 97 %     Procedures     None    ED Course     Nursing Notes, Past Medical Hx,Past Surgical Hx, Social Hx, Allergies, and Family Hx were reviewed. The patient was given the following medications:  No orders of the defined types were placed in this encounter. CONSULTS:  None    Review of Systems     Review of Systems   Musculoskeletal:  Positive for arthralgias. Skin:  Negative for wound. Neurological:  Negative for weakness and light-headedness. Past Medical, Surgical, Family, and Social History     She has a past medical history of Acute bronchitis, BV (bacterial vaginosis), COVID-19, Edema, Hirsutism, Obesity, Prediabetes, and Shingles. She has no past surgical history on file.   Her family history includes Colon Polyps in her mother; Deep Vein Thrombosis in her brother; Diabetes in her mother; Elevated Lipids in her father

## 2023-09-05 ENCOUNTER — HOSPITAL ENCOUNTER (EMERGENCY)
Age: 48
Discharge: HOME | End: 2023-09-05
Payer: COMMERCIAL

## 2023-09-05 VITALS
BODY MASS INDEX: 43.95 KG/M2 | TEMPERATURE: 97.5 F | DIASTOLIC BLOOD PRESSURE: 91 MMHG | HEART RATE: 91 BPM | HEIGHT: 67 IN | OXYGEN SATURATION: 100 % | SYSTOLIC BLOOD PRESSURE: 136 MMHG | RESPIRATION RATE: 15 BRPM | WEIGHT: 280 LBS

## 2023-09-05 DIAGNOSIS — M62.838 TRAPEZIUS MUSCLE SPASM: Primary | ICD-10-CM

## 2023-09-05 PROCEDURE — 96372 THER/PROPH/DIAG INJ SC/IM: CPT

## 2023-09-05 PROCEDURE — 6360000002 HC RX W HCPCS: Performed by: NURSE PRACTITIONER

## 2023-09-05 PROCEDURE — 99284 EMERGENCY DEPT VISIT MOD MDM: CPT

## 2023-09-05 PROCEDURE — 6370000000 HC RX 637 (ALT 250 FOR IP): Performed by: NURSE PRACTITIONER

## 2023-09-05 RX ORDER — CYCLOBENZAPRINE HCL 10 MG
10 TABLET ORAL 3 TIMES DAILY PRN
Qty: 15 TABLET | Refills: 0 | Status: SHIPPED | OUTPATIENT
Start: 2023-09-05 | End: 2023-09-09 | Stop reason: SDUPTHER

## 2023-09-05 RX ORDER — KETOROLAC TROMETHAMINE 30 MG/ML
15 INJECTION, SOLUTION INTRAMUSCULAR; INTRAVENOUS ONCE
Status: COMPLETED | OUTPATIENT
Start: 2023-09-05 | End: 2023-09-05

## 2023-09-05 RX ORDER — DIAZEPAM 5 MG/1
5 TABLET ORAL ONCE
Status: COMPLETED | OUTPATIENT
Start: 2023-09-05 | End: 2023-09-05

## 2023-09-05 RX ORDER — LIDOCAINE 50 MG/G
1 PATCH TOPICAL DAILY
Qty: 30 PATCH | Refills: 0 | Status: SHIPPED | OUTPATIENT
Start: 2023-09-05

## 2023-09-05 RX ORDER — LIDOCAINE 4 G/G
1 PATCH TOPICAL DAILY
Status: DISCONTINUED | OUTPATIENT
Start: 2023-09-05 | End: 2023-09-05 | Stop reason: HOSPADM

## 2023-09-05 RX ORDER — NAPROXEN 500 MG/1
500 TABLET ORAL 2 TIMES DAILY WITH MEALS
Qty: 20 TABLET | Refills: 1 | Status: SHIPPED | OUTPATIENT
Start: 2023-09-05

## 2023-09-05 RX ADMIN — KETOROLAC TROMETHAMINE 15 MG: 30 INJECTION, SOLUTION INTRAMUSCULAR; INTRAVENOUS at 14:14

## 2023-09-05 RX ADMIN — DIAZEPAM 5 MG: 5 TABLET ORAL at 14:13

## 2023-09-05 ASSESSMENT — PAIN SCALES - GENERAL: PAINLEVEL_OUTOF10: 8

## 2023-09-05 ASSESSMENT — ENCOUNTER SYMPTOMS: RESPIRATORY NEGATIVE: 1

## 2023-09-05 ASSESSMENT — PAIN - FUNCTIONAL ASSESSMENT
PAIN_FUNCTIONAL_ASSESSMENT: 0-10
PAIN_FUNCTIONAL_ASSESSMENT: 0-10

## 2023-09-05 ASSESSMENT — PAIN DESCRIPTION - LOCATION: LOCATION: NECK

## 2023-09-05 NOTE — DISCHARGE INSTRUCTIONS
- take medications as instructed.   Flexeril will cause drowsiness, do not drive or operate heavy machinery while on this medication (recommend taking at night)  - warm compresses to right neck intermittently  - follow up with primary care provider in 1-2 weeks, sooner if needed  - return to the ED for uncontrollable pain, numbness/tingling in arm, concerns for worsening condition

## 2023-09-09 ENCOUNTER — APPOINTMENT (OUTPATIENT)
Dept: CT IMAGING | Age: 48
End: 2023-09-09
Payer: COMMERCIAL

## 2023-09-09 ENCOUNTER — APPOINTMENT (OUTPATIENT)
Dept: GENERAL RADIOLOGY | Age: 48
End: 2023-09-09
Payer: COMMERCIAL

## 2023-09-09 ENCOUNTER — HOSPITAL ENCOUNTER (EMERGENCY)
Age: 48
Discharge: HOME | End: 2023-09-09
Attending: EMERGENCY MEDICINE
Payer: COMMERCIAL

## 2023-09-09 VITALS
BODY MASS INDEX: 43.95 KG/M2 | OXYGEN SATURATION: 94 % | WEIGHT: 280 LBS | HEART RATE: 78 BPM | SYSTOLIC BLOOD PRESSURE: 148 MMHG | HEIGHT: 67 IN | RESPIRATION RATE: 20 BRPM | TEMPERATURE: 97.9 F | DIASTOLIC BLOOD PRESSURE: 91 MMHG

## 2023-09-09 DIAGNOSIS — R07.89 CHEST WALL PAIN: ICD-10-CM

## 2023-09-09 DIAGNOSIS — M54.2 NECK PAIN: Primary | ICD-10-CM

## 2023-09-09 LAB
ALBUMIN SERPL-MCNC: 3.5 G/DL (ref 3.4–5)
ALBUMIN/GLOB SERPL: 1.1 {RATIO} (ref 1.1–2.2)
ALP SERPL-CCNC: 59 U/L (ref 40–129)
ALT SERPL-CCNC: 34 U/L (ref 10–40)
ANION GAP SERPL CALCULATED.3IONS-SCNC: 13 MMOL/L (ref 3–16)
AST SERPL-CCNC: 29 U/L (ref 15–37)
BASOPHILS # BLD: 0 K/UL (ref 0–0.2)
BASOPHILS NFR BLD: 0.6 %
BILIRUB SERPL-MCNC: 0.3 MG/DL (ref 0–1)
BUN SERPL-MCNC: 12 MG/DL (ref 7–20)
CALCIUM SERPL-MCNC: 8.6 MG/DL (ref 8.3–10.6)
CHLORIDE SERPL-SCNC: 102 MMOL/L (ref 99–110)
CO2 SERPL-SCNC: 22 MMOL/L (ref 21–32)
CREAT SERPL-MCNC: 0.9 MG/DL (ref 0.6–1.1)
D DIMER: 0.71 UG/ML FEU (ref 0–0.6)
DEPRECATED RDW RBC AUTO: 13.1 % (ref 12.4–15.4)
EKG ATRIAL RATE: 87 BPM
EKG DIAGNOSIS: NORMAL
EKG P AXIS: 47 DEGREES
EKG P-R INTERVAL: 156 MS
EKG Q-T INTERVAL: 378 MS
EKG QRS DURATION: 86 MS
EKG QTC CALCULATION (BAZETT): 454 MS
EKG R AXIS: 35 DEGREES
EKG T AXIS: 35 DEGREES
EKG VENTRICULAR RATE: 87 BPM
EOSINOPHIL # BLD: 0.2 K/UL (ref 0–0.6)
EOSINOPHIL NFR BLD: 4.5 %
GFR SERPLBLD CREATININE-BSD FMLA CKD-EPI: >60 ML/MIN/{1.73_M2}
GLUCOSE SERPL-MCNC: 119 MG/DL (ref 70–99)
HCT VFR BLD AUTO: 39.2 % (ref 36–48)
HGB BLD-MCNC: 13.3 G/DL (ref 12–16)
LIPASE SERPL-CCNC: 29 U/L (ref 13–60)
LYMPHOCYTES # BLD: 1 K/UL (ref 1–5.1)
LYMPHOCYTES NFR BLD: 19.7 %
MCH RBC QN AUTO: 31.1 PG (ref 26–34)
MCHC RBC AUTO-ENTMCNC: 33.9 G/DL (ref 31–36)
MCV RBC AUTO: 91.9 FL (ref 80–100)
MONOCYTES # BLD: 0.4 K/UL (ref 0–1.3)
MONOCYTES NFR BLD: 7.5 %
NEUTROPHILS # BLD: 3.3 K/UL (ref 1.7–7.7)
NEUTROPHILS NFR BLD: 67.7 %
PLATELET # BLD AUTO: 115 K/UL (ref 135–450)
PMV BLD AUTO: 8.1 FL (ref 5–10.5)
POTASSIUM SERPL-SCNC: 4.3 MMOL/L (ref 3.5–5.1)
PROT SERPL-MCNC: 6.7 G/DL (ref 6.4–8.2)
RBC # BLD AUTO: 4.26 M/UL (ref 4–5.2)
SODIUM SERPL-SCNC: 137 MMOL/L (ref 136–145)
TROPONIN, HIGH SENSITIVITY: <6 NG/L (ref 0–14)
WBC # BLD AUTO: 4.9 K/UL (ref 4–11)

## 2023-09-09 PROCEDURE — 85379 FIBRIN DEGRADATION QUANT: CPT

## 2023-09-09 PROCEDURE — 6360000004 HC RX CONTRAST MEDICATION: Performed by: EMERGENCY MEDICINE

## 2023-09-09 PROCEDURE — 93005 ELECTROCARDIOGRAM TRACING: CPT | Performed by: EMERGENCY MEDICINE

## 2023-09-09 PROCEDURE — 83690 ASSAY OF LIPASE: CPT

## 2023-09-09 PROCEDURE — 74177 CT ABD & PELVIS W/CONTRAST: CPT

## 2023-09-09 PROCEDURE — 6360000002 HC RX W HCPCS: Performed by: EMERGENCY MEDICINE

## 2023-09-09 PROCEDURE — 96374 THER/PROPH/DIAG INJ IV PUSH: CPT

## 2023-09-09 PROCEDURE — 71275 CT ANGIOGRAPHY CHEST: CPT

## 2023-09-09 PROCEDURE — 80053 COMPREHEN METABOLIC PANEL: CPT

## 2023-09-09 PROCEDURE — 71046 X-RAY EXAM CHEST 2 VIEWS: CPT

## 2023-09-09 PROCEDURE — 99285 EMERGENCY DEPT VISIT HI MDM: CPT

## 2023-09-09 PROCEDURE — 84484 ASSAY OF TROPONIN QUANT: CPT

## 2023-09-09 PROCEDURE — 85025 COMPLETE CBC W/AUTO DIFF WBC: CPT

## 2023-09-09 RX ORDER — CYCLOBENZAPRINE HCL 10 MG
10 TABLET ORAL 3 TIMES DAILY PRN
Qty: 15 TABLET | Refills: 0 | Status: SHIPPED | OUTPATIENT
Start: 2023-09-09

## 2023-09-09 RX ORDER — HYDROCODONE BITARTRATE AND ACETAMINOPHEN 5; 325 MG/1; MG/1
1 TABLET ORAL EVERY 6 HOURS PRN
Qty: 10 TABLET | Refills: 0 | Status: SHIPPED | OUTPATIENT
Start: 2023-09-09 | End: 2023-09-12

## 2023-09-09 RX ORDER — MORPHINE SULFATE 4 MG/ML
4 INJECTION INTRAVENOUS
Status: COMPLETED | OUTPATIENT
Start: 2023-09-09 | End: 2023-09-09

## 2023-09-09 RX ADMIN — IOPAMIDOL 75 ML: 755 INJECTION, SOLUTION INTRAVENOUS at 05:30

## 2023-09-09 RX ADMIN — MORPHINE SULFATE 4 MG: 4 INJECTION INTRAVENOUS at 02:49

## 2023-09-09 ASSESSMENT — PAIN SCALES - GENERAL
PAINLEVEL_OUTOF10: 9
PAINLEVEL_OUTOF10: 10

## 2023-09-09 ASSESSMENT — PAIN - FUNCTIONAL ASSESSMENT: PAIN_FUNCTIONAL_ASSESSMENT: 0-10

## 2023-09-09 ASSESSMENT — PAIN DESCRIPTION - LOCATION: LOCATION: CHEST

## 2023-09-09 NOTE — ED NOTES
Reviewed discharge instructions, medication reconciliation, and patient education information with patient. Patient stated understanding, and answered questions to satisfaction. Patient verbalized satisfaction of care. PIV removed at this time. Patient ambulated safely to exit with a steady gait in no obvious acute distress. Patient verbalized understanding of returning to ER if symptoms worsen, and to follow up with primary health care provider.       Adrienne Sanderson RN  09/09/23 [id number]

## 2024-04-10 NOTE — ED NOTES
RN at bedside to place PIV and obtain bloodwork. Patient has sister at bedside. 18g PIV placed and attempted to obtain bloodwork. Bloodwork unable to be obtained. PIV flushed with no infiltration. Patient informed that we are able to give her her pain medication, but that we will have to look for another point of access to obtain bloodwork. Patient previously screaming out in pain, and sister telling nursing staff to Vicente, we have a problem in here\". Patient states that she feels like her belly has \"sundowners, just like my patients\". She states that she has been seen multiple times in the ED and \"I feel better then it comes back at the exact same time everyday\".       Pranav Hankins RN  05/04/21 2000 chest pain

## 2024-06-19 ENCOUNTER — OFFICE VISIT (OUTPATIENT)
Dept: INTERNAL MEDICINE CLINIC | Age: 49
End: 2024-06-19
Payer: COMMERCIAL

## 2024-06-19 VITALS
HEIGHT: 67 IN | WEIGHT: 281 LBS | BODY MASS INDEX: 44.1 KG/M2 | DIASTOLIC BLOOD PRESSURE: 70 MMHG | SYSTOLIC BLOOD PRESSURE: 124 MMHG

## 2024-06-19 DIAGNOSIS — I89.0 LYMPHEDEMA: Primary | ICD-10-CM

## 2024-06-19 DIAGNOSIS — Z12.31 ENCOUNTER FOR SCREENING MAMMOGRAM FOR MALIGNANT NEOPLASM OF BREAST: ICD-10-CM

## 2024-06-19 DIAGNOSIS — Z12.4 PAP SMEAR FOR CERVICAL CANCER SCREENING: ICD-10-CM

## 2024-06-19 DIAGNOSIS — K21.9 GASTROESOPHAGEAL REFLUX DISEASE WITHOUT ESOPHAGITIS: ICD-10-CM

## 2024-06-19 DIAGNOSIS — Z00.00 ANNUAL PHYSICAL EXAM: ICD-10-CM

## 2024-06-19 PROCEDURE — 99386 PREV VISIT NEW AGE 40-64: CPT | Performed by: STUDENT IN AN ORGANIZED HEALTH CARE EDUCATION/TRAINING PROGRAM

## 2024-06-19 RX ORDER — PANTOPRAZOLE SODIUM 40 MG/1
40 TABLET, DELAYED RELEASE ORAL DAILY
Qty: 90 TABLET | Refills: 1 | Status: SHIPPED | OUTPATIENT
Start: 2024-06-19

## 2024-06-19 SDOH — ECONOMIC STABILITY: FOOD INSECURITY: WITHIN THE PAST 12 MONTHS, THE FOOD YOU BOUGHT JUST DIDN'T LAST AND YOU DIDN'T HAVE MONEY TO GET MORE.: NEVER TRUE

## 2024-06-19 SDOH — ECONOMIC STABILITY: HOUSING INSECURITY
IN THE LAST 12 MONTHS, WAS THERE A TIME WHEN YOU DID NOT HAVE A STEADY PLACE TO SLEEP OR SLEPT IN A SHELTER (INCLUDING NOW)?: NO

## 2024-06-19 SDOH — HEALTH STABILITY: PHYSICAL HEALTH: ON AVERAGE, HOW MANY DAYS PER WEEK DO YOU ENGAGE IN MODERATE TO STRENUOUS EXERCISE (LIKE A BRISK WALK)?: 4 DAYS

## 2024-06-19 SDOH — ECONOMIC STABILITY: FOOD INSECURITY: WITHIN THE PAST 12 MONTHS, YOU WORRIED THAT YOUR FOOD WOULD RUN OUT BEFORE YOU GOT MONEY TO BUY MORE.: NEVER TRUE

## 2024-06-19 SDOH — ECONOMIC STABILITY: INCOME INSECURITY: HOW HARD IS IT FOR YOU TO PAY FOR THE VERY BASICS LIKE FOOD, HOUSING, MEDICAL CARE, AND HEATING?: NOT HARD AT ALL

## 2024-06-19 SDOH — HEALTH STABILITY: PHYSICAL HEALTH: ON AVERAGE, HOW MANY MINUTES DO YOU ENGAGE IN EXERCISE AT THIS LEVEL?: 60 MIN

## 2024-06-19 ASSESSMENT — PATIENT HEALTH QUESTIONNAIRE - PHQ9
2. FEELING DOWN, DEPRESSED OR HOPELESS: NOT AT ALL
SUM OF ALL RESPONSES TO PHQ QUESTIONS 1-9: 0
1. LITTLE INTEREST OR PLEASURE IN DOING THINGS: NOT AT ALL
SUM OF ALL RESPONSES TO PHQ QUESTIONS 1-9: 0
SUM OF ALL RESPONSES TO PHQ9 QUESTIONS 1 & 2: 0

## 2024-06-19 ASSESSMENT — ENCOUNTER SYMPTOMS
EYE PAIN: 0
VOMITING: 0
CONSTIPATION: 0
CHEST TIGHTNESS: 0
DIARRHEA: 0
ABDOMINAL PAIN: 0
SINUS PAIN: 0
SHORTNESS OF BREATH: 0

## 2024-06-20 NOTE — ASSESSMENT & PLAN NOTE
- routine labs ordered  - mammogram ordered  - patient will call GI to find out when she is due for colonoscopy  - dentist, eye doctor  - healthy diet, exercise

## 2024-06-20 NOTE — ASSESSMENT & PLAN NOTE
Chronic problem with worsening lately. PATY hose cut her off and are uncomfortable. Mother has lymphedema.  - referred to PT for manual therapy/drainage + measuring for compression  - recommend weight loss  - labwork ordered

## 2024-06-22 DIAGNOSIS — Z00.00 ANNUAL PHYSICAL EXAM: ICD-10-CM

## 2024-06-22 LAB
ALBUMIN SERPL-MCNC: 3.9 G/DL (ref 3.4–5)
ALBUMIN/GLOB SERPL: 1.6 {RATIO} (ref 1.1–2.2)
ALP SERPL-CCNC: 76 U/L (ref 40–129)
ALT SERPL-CCNC: 28 U/L (ref 10–40)
ANION GAP SERPL CALCULATED.3IONS-SCNC: 11 MMOL/L (ref 3–16)
AST SERPL-CCNC: 19 U/L (ref 15–37)
BACTERIA URNS QL MICRO: ABNORMAL /HPF
BASOPHILS # BLD: 0.1 K/UL (ref 0–0.2)
BASOPHILS NFR BLD: 0.8 %
BILIRUB SERPL-MCNC: 0.5 MG/DL (ref 0–1)
BILIRUB UR QL STRIP.AUTO: NEGATIVE
BUN SERPL-MCNC: 9 MG/DL (ref 7–20)
CALCIUM SERPL-MCNC: 9 MG/DL (ref 8.3–10.6)
CHLORIDE SERPL-SCNC: 105 MMOL/L (ref 99–110)
CHOLEST SERPL-MCNC: 200 MG/DL (ref 0–199)
CLARITY UR: CLEAR
CO2 SERPL-SCNC: 24 MMOL/L (ref 21–32)
COLOR UR: YELLOW
CREAT SERPL-MCNC: 0.8 MG/DL (ref 0.6–1.1)
DEPRECATED RDW RBC AUTO: 13 % (ref 12.4–15.4)
EOSINOPHIL # BLD: 0.3 K/UL (ref 0–0.6)
EOSINOPHIL NFR BLD: 5.2 %
EPI CELLS #/AREA URNS AUTO: 4 /HPF (ref 0–5)
GFR SERPLBLD CREATININE-BSD FMLA CKD-EPI: >90 ML/MIN/{1.73_M2}
GLUCOSE SERPL-MCNC: 88 MG/DL (ref 70–99)
GLUCOSE UR STRIP.AUTO-MCNC: NEGATIVE MG/DL
HCT VFR BLD AUTO: 43.4 % (ref 36–48)
HDLC SERPL-MCNC: 46 MG/DL (ref 40–60)
HGB BLD-MCNC: 14.5 G/DL (ref 12–16)
HGB UR QL STRIP.AUTO: ABNORMAL
HYALINE CASTS #/AREA URNS AUTO: 0 /LPF (ref 0–8)
KETONES UR STRIP.AUTO-MCNC: NEGATIVE MG/DL
LDL CHOLESTEROL: 119 MG/DL
LEUKOCYTE ESTERASE UR QL STRIP.AUTO: ABNORMAL
LYMPHOCYTES # BLD: 2.2 K/UL (ref 1–5.1)
LYMPHOCYTES NFR BLD: 33.4 %
MCH RBC QN AUTO: 30.9 PG (ref 26–34)
MCHC RBC AUTO-ENTMCNC: 33.5 G/DL (ref 31–36)
MCV RBC AUTO: 92.1 FL (ref 80–100)
MONOCYTES # BLD: 0.4 K/UL (ref 0–1.3)
MONOCYTES NFR BLD: 5.7 %
NEUTROPHILS # BLD: 3.5 K/UL (ref 1.7–7.7)
NEUTROPHILS NFR BLD: 54.9 %
NITRITE UR QL STRIP.AUTO: NEGATIVE
PH UR STRIP.AUTO: 6.5 [PH] (ref 5–8)
PLATELET # BLD AUTO: 183 K/UL (ref 135–450)
PMV BLD AUTO: 8.5 FL (ref 5–10.5)
POTASSIUM SERPL-SCNC: 4.4 MMOL/L (ref 3.5–5.1)
PROT SERPL-MCNC: 6.4 G/DL (ref 6.4–8.2)
PROT UR STRIP.AUTO-MCNC: ABNORMAL MG/DL
RBC # BLD AUTO: 4.71 M/UL (ref 4–5.2)
RBC CLUMPS #/AREA URNS AUTO: 316 /HPF (ref 0–4)
SODIUM SERPL-SCNC: 140 MMOL/L (ref 136–145)
SP GR UR STRIP.AUTO: 1.02 (ref 1–1.03)
TRIGL SERPL-MCNC: 173 MG/DL (ref 0–150)
TSH SERPL DL<=0.005 MIU/L-ACNC: 2.27 UIU/ML (ref 0.27–4.2)
UA DIPSTICK W REFLEX MICRO PNL UR: YES
URN SPEC COLLECT METH UR: ABNORMAL
UROBILINOGEN UR STRIP-ACNC: 0.2 E.U./DL
VLDLC SERPL CALC-MCNC: 35 MG/DL
WBC # BLD AUTO: 6.5 K/UL (ref 4–11)
WBC #/AREA URNS AUTO: 5 /HPF (ref 0–5)

## 2024-06-23 LAB
EST. AVERAGE GLUCOSE BLD GHB EST-MCNC: 111.2 MG/DL
HBA1C MFR BLD: 5.5 %

## 2024-06-26 ENCOUNTER — PATIENT MESSAGE (OUTPATIENT)
Dept: INTERNAL MEDICINE CLINIC | Age: 49
End: 2024-06-26

## 2024-06-27 NOTE — TELEPHONE ENCOUNTER
From: Rozina Monterroso  To: Dr. Tiffany Sweeney  Sent: 6/26/2024 11:53 PM EDT  Subject: Lab results    Hi Dr Sweeney, I saw my lab results and I'm a little concerned about my UA results. Do you think I have a bladder infection? No pain when I urinate but I've had lower back pain that I attributed to working day and some night shifts. What are your thoughts?

## 2024-07-02 ENCOUNTER — HOSPITAL ENCOUNTER (OUTPATIENT)
Dept: PHYSICAL THERAPY | Age: 49
Setting detail: THERAPIES SERIES
Discharge: HOME | End: 2024-07-02

## 2024-07-02 NOTE — PLAN OF CARE
[] Adjusted    Overall Progression Towards Functional goals/ Treatment Progress Update:  [] Patient is progressing as expected towards functional goals listed.    [] Progression is slowed due to complexities/Impairments listed.  [] Progression has been slowed due to co-morbidities.  [x] Plan just implemented, too soon (<30days) to assess goals progression   [] Goals require adjustment due to lack of progress  [] Patient is not progressing as expected and requires additional follow up with physician  [] Other:     TREATMENT PLAN     Frequency/Duration: {POC Frequecy:13915}/week for {weeks:29199} weeks for the following treatment interventions:    Interventions:  {PT INTERVENTIONS:37224}    Plan: {zjew8wifx:82658::\"POC initiated as per evaluation\"}    Electronically Signed by Joan Armstrong, PT  Date: 07/02/2024     Note: Portions of this note have been templated and/or copied from initial evaluation, reassessments and prior notes for documentation efficiency.    Note: If patient does not return for scheduled/recommended follow up visits, this note will serve as a discharge from care along with the most recent update on progress.    Lymphedema Evaluation